# Patient Record
Sex: FEMALE | Race: WHITE | NOT HISPANIC OR LATINO | Employment: OTHER | URBAN - METROPOLITAN AREA
[De-identification: names, ages, dates, MRNs, and addresses within clinical notes are randomized per-mention and may not be internally consistent; named-entity substitution may affect disease eponyms.]

---

## 2017-01-01 ENCOUNTER — APPOINTMENT (OUTPATIENT)
Dept: GENERAL RADIOLOGY | Facility: HOSPITAL | Age: 82
End: 2017-01-01

## 2017-01-01 ENCOUNTER — HOSPITAL ENCOUNTER (EMERGENCY)
Facility: HOSPITAL | Age: 82
Discharge: HOME | End: 2017-09-28
Attending: EMERGENCY MEDICINE | Admitting: EMERGENCY MEDICINE

## 2017-01-01 VITALS
BODY MASS INDEX: 27.31 KG/M2 | DIASTOLIC BLOOD PRESSURE: 98 MMHG | OXYGEN SATURATION: 98 % | HEIGHT: 64 IN | TEMPERATURE: 97.6 F | WEIGHT: 160 LBS | SYSTOLIC BLOOD PRESSURE: 189 MMHG | HEART RATE: 61 BPM | RESPIRATION RATE: 18 BRPM

## 2017-01-01 DIAGNOSIS — F03.90 DEMENTIA WITHOUT BEHAVIORAL DISTURBANCE, UNSPECIFIED DEMENTIA TYPE: ICD-10-CM

## 2017-01-01 DIAGNOSIS — N39.0 URINARY TRACT INFECTION IN FEMALE: Primary | ICD-10-CM

## 2017-01-01 LAB
ALBUMIN SERPL-MCNC: 3.7 G/DL (ref 3.5–5.2)
ALBUMIN/GLOB SERPL: 1.3 G/DL
ALP SERPL-CCNC: 49 U/L (ref 39–117)
ALT SERPL W P-5'-P-CCNC: 11 U/L (ref 1–33)
ANION GAP SERPL CALCULATED.3IONS-SCNC: 11.2 MMOL/L
AST SERPL-CCNC: 17 U/L (ref 1–32)
BACTERIA SPEC AEROBE CULT: ABNORMAL
BACTERIA SPEC AEROBE CULT: ABNORMAL
BACTERIA UR QL AUTO: ABNORMAL /HPF
BASOPHILS # BLD AUTO: 0.03 10*3/MM3 (ref 0–0.2)
BASOPHILS NFR BLD AUTO: 0.4 % (ref 0–1.5)
BILIRUB SERPL-MCNC: 0.4 MG/DL (ref 0.1–1.2)
BILIRUB UR QL STRIP: NEGATIVE
BUN BLD-MCNC: 22 MG/DL (ref 8–23)
BUN/CREAT SERPL: 25.3 (ref 7–25)
CALCIUM SPEC-SCNC: 9.2 MG/DL (ref 8.6–10.5)
CHLORIDE SERPL-SCNC: 107 MMOL/L (ref 98–107)
CLARITY UR: ABNORMAL
CO2 SERPL-SCNC: 26.8 MMOL/L (ref 22–29)
COLOR UR: YELLOW
CREAT BLD-MCNC: 0.87 MG/DL (ref 0.57–1)
DEPRECATED RDW RBC AUTO: 48.2 FL (ref 37–54)
EOSINOPHIL # BLD AUTO: 0.38 10*3/MM3 (ref 0–0.7)
EOSINOPHIL NFR BLD AUTO: 4.8 % (ref 0.3–6.2)
ERYTHROCYTE [DISTWIDTH] IN BLOOD BY AUTOMATED COUNT: 13.8 % (ref 11.7–13)
GFR SERPL CREATININE-BSD FRML MDRD: 61 ML/MIN/1.73
GLOBULIN UR ELPH-MCNC: 2.8 GM/DL
GLUCOSE BLD-MCNC: 101 MG/DL (ref 65–99)
GLUCOSE UR STRIP-MCNC: NEGATIVE MG/DL
HCT VFR BLD AUTO: 43.4 % (ref 35.6–45.5)
HGB BLD-MCNC: 13.7 G/DL (ref 11.9–15.5)
HGB UR QL STRIP.AUTO: ABNORMAL
HOLD SPECIMEN: NORMAL
HOLD SPECIMEN: NORMAL
HYALINE CASTS UR QL AUTO: ABNORMAL /LPF
IMM GRANULOCYTES # BLD: 0.03 10*3/MM3 (ref 0–0.03)
IMM GRANULOCYTES NFR BLD: 0.4 % (ref 0–0.5)
KETONES UR QL STRIP: NEGATIVE
LEUKOCYTE ESTERASE UR QL STRIP.AUTO: ABNORMAL
LYMPHOCYTES # BLD AUTO: 2.73 10*3/MM3 (ref 0.9–4.8)
LYMPHOCYTES NFR BLD AUTO: 34.3 % (ref 19.6–45.3)
MAGNESIUM SERPL-MCNC: 2.1 MG/DL (ref 1.6–2.4)
MCH RBC QN AUTO: 30.2 PG (ref 26.9–32)
MCHC RBC AUTO-ENTMCNC: 31.6 G/DL (ref 32.4–36.3)
MCV RBC AUTO: 95.8 FL (ref 80.5–98.2)
MONOCYTES # BLD AUTO: 0.7 10*3/MM3 (ref 0.2–1.2)
MONOCYTES NFR BLD AUTO: 8.8 % (ref 5–12)
NEUTROPHILS # BLD AUTO: 4.08 10*3/MM3 (ref 1.9–8.1)
NEUTROPHILS NFR BLD AUTO: 51.3 % (ref 42.7–76)
NITRITE UR QL STRIP: POSITIVE
PH UR STRIP.AUTO: 6 [PH] (ref 5–8)
PLATELET # BLD AUTO: 150 10*3/MM3 (ref 140–500)
PMV BLD AUTO: 10.5 FL (ref 6–12)
POTASSIUM BLD-SCNC: 3.9 MMOL/L (ref 3.5–5.2)
PROT SERPL-MCNC: 6.5 G/DL (ref 6–8.5)
PROT UR QL STRIP: ABNORMAL
RBC # BLD AUTO: 4.53 10*6/MM3 (ref 3.9–5.2)
RBC # UR: ABNORMAL /HPF
REF LAB TEST METHOD: ABNORMAL
SODIUM BLD-SCNC: 145 MMOL/L (ref 136–145)
SP GR UR STRIP: 1.02 (ref 1–1.03)
SQUAMOUS #/AREA URNS HPF: ABNORMAL /HPF
TROPONIN T SERPL-MCNC: <0.01 NG/ML (ref 0–0.03)
UROBILINOGEN UR QL STRIP: ABNORMAL
WBC NRBC COR # BLD: 7.95 10*3/MM3 (ref 4.5–10.7)
WBC UR QL AUTO: ABNORMAL /HPF
WHOLE BLOOD HOLD SPECIMEN: NORMAL
WHOLE BLOOD HOLD SPECIMEN: NORMAL

## 2017-01-01 PROCEDURE — 87186 SC STD MICRODIL/AGAR DIL: CPT | Performed by: EMERGENCY MEDICINE

## 2017-01-01 PROCEDURE — 85025 COMPLETE CBC W/AUTO DIFF WBC: CPT | Performed by: EMERGENCY MEDICINE

## 2017-01-01 PROCEDURE — 81001 URINALYSIS AUTO W/SCOPE: CPT | Performed by: EMERGENCY MEDICINE

## 2017-01-01 PROCEDURE — 96365 THER/PROPH/DIAG IV INF INIT: CPT

## 2017-01-01 PROCEDURE — 87086 URINE CULTURE/COLONY COUNT: CPT | Performed by: EMERGENCY MEDICINE

## 2017-01-01 PROCEDURE — 99285 EMERGENCY DEPT VISIT HI MDM: CPT

## 2017-01-01 PROCEDURE — 80053 COMPREHEN METABOLIC PANEL: CPT | Performed by: EMERGENCY MEDICINE

## 2017-01-01 PROCEDURE — 25010000002 CEFTRIAXONE PER 250 MG: Performed by: EMERGENCY MEDICINE

## 2017-01-01 PROCEDURE — 84484 ASSAY OF TROPONIN QUANT: CPT | Performed by: EMERGENCY MEDICINE

## 2017-01-01 PROCEDURE — 83735 ASSAY OF MAGNESIUM: CPT | Performed by: EMERGENCY MEDICINE

## 2017-01-01 PROCEDURE — 71010 HC CHEST PA OR AP: CPT

## 2017-01-01 RX ORDER — CEPHALEXIN 500 MG/1
500 CAPSULE ORAL 3 TIMES DAILY
Qty: 21 CAPSULE | Refills: 0 | Status: SHIPPED | OUTPATIENT
Start: 2017-01-01 | End: 2018-01-01 | Stop reason: HOSPADM

## 2017-01-01 RX ORDER — CEFTRIAXONE SODIUM 1 G/50ML
1 INJECTION, SOLUTION INTRAVENOUS ONCE
Status: COMPLETED | OUTPATIENT
Start: 2017-01-01 | End: 2017-01-01

## 2017-01-01 RX ORDER — SODIUM CHLORIDE 0.9 % (FLUSH) 0.9 %
10 SYRINGE (ML) INJECTION AS NEEDED
Status: DISCONTINUED | OUTPATIENT
Start: 2017-01-01 | End: 2017-01-01 | Stop reason: HOSPADM

## 2017-01-01 RX ADMIN — CEFTRIAXONE SODIUM 1 G: 1 INJECTION, SOLUTION INTRAVENOUS at 01:56

## 2018-01-01 ENCOUNTER — APPOINTMENT (OUTPATIENT)
Dept: CARDIOLOGY | Facility: HOSPITAL | Age: 83
End: 2018-01-01
Attending: HOSPITALIST

## 2018-01-01 ENCOUNTER — APPOINTMENT (OUTPATIENT)
Dept: CT IMAGING | Facility: HOSPITAL | Age: 83
End: 2018-01-01

## 2018-01-01 ENCOUNTER — HOSPITAL ENCOUNTER (INPATIENT)
Facility: HOSPITAL | Age: 83
LOS: 3 days | End: 2018-05-06
Attending: INTERNAL MEDICINE | Admitting: INTERNAL MEDICINE

## 2018-01-01 ENCOUNTER — APPOINTMENT (OUTPATIENT)
Dept: GENERAL RADIOLOGY | Facility: HOSPITAL | Age: 83
End: 2018-01-01

## 2018-01-01 ENCOUNTER — APPOINTMENT (OUTPATIENT)
Dept: CARDIOLOGY | Facility: HOSPITAL | Age: 83
End: 2018-01-01
Attending: INTERNAL MEDICINE

## 2018-01-01 ENCOUNTER — LAB REQUISITION (OUTPATIENT)
Dept: LAB | Facility: HOSPITAL | Age: 83
End: 2018-01-01

## 2018-01-01 ENCOUNTER — HOSPITAL ENCOUNTER (INPATIENT)
Facility: HOSPITAL | Age: 83
LOS: 8 days | Discharge: HOSPICE | End: 2018-05-03
Attending: EMERGENCY MEDICINE | Admitting: INTERNAL MEDICINE

## 2018-01-01 ENCOUNTER — HOSPITAL ENCOUNTER (INPATIENT)
Facility: HOSPITAL | Age: 83
LOS: 6 days | Discharge: NURSING, REHAB OR LONG-TERM CARE | End: 2018-04-16
Attending: EMERGENCY MEDICINE | Admitting: INTERNAL MEDICINE

## 2018-01-01 ENCOUNTER — APPOINTMENT (OUTPATIENT)
Dept: CT IMAGING | Facility: HOSPITAL | Age: 83
End: 2018-01-01
Attending: PSYCHIATRY & NEUROLOGY

## 2018-01-01 VITALS
DIASTOLIC BLOOD PRESSURE: 89 MMHG | TEMPERATURE: 98.4 F | SYSTOLIC BLOOD PRESSURE: 144 MMHG | HEIGHT: 67 IN | BODY MASS INDEX: 23.54 KG/M2 | RESPIRATION RATE: 20 BRPM | OXYGEN SATURATION: 94 % | WEIGHT: 150 LBS | HEART RATE: 115 BPM

## 2018-01-01 VITALS — OXYGEN SATURATION: 58 % | SYSTOLIC BLOOD PRESSURE: 95 MMHG | DIASTOLIC BLOOD PRESSURE: 63 MMHG | TEMPERATURE: 98.5 F

## 2018-01-01 VITALS
OXYGEN SATURATION: 92 % | TEMPERATURE: 98 F | BODY MASS INDEX: 30.93 KG/M2 | HEART RATE: 106 BPM | SYSTOLIC BLOOD PRESSURE: 123 MMHG | WEIGHT: 192.44 LBS | DIASTOLIC BLOOD PRESSURE: 73 MMHG | HEIGHT: 66 IN | RESPIRATION RATE: 16 BRPM

## 2018-01-01 DIAGNOSIS — R77.8 ELEVATED TROPONIN: ICD-10-CM

## 2018-01-01 DIAGNOSIS — R13.12 OROPHARYNGEAL DYSPHAGIA: ICD-10-CM

## 2018-01-01 DIAGNOSIS — Z74.09 IMPAIRED FUNCTIONAL MOBILITY AND ACTIVITY TOLERANCE: ICD-10-CM

## 2018-01-01 DIAGNOSIS — Z00.00 ROUTINE GENERAL MEDICAL EXAMINATION AT A HEALTH CARE FACILITY: ICD-10-CM

## 2018-01-01 DIAGNOSIS — G92.9 TOXIC ENCEPHALOPATHY: ICD-10-CM

## 2018-01-01 DIAGNOSIS — N39.0 UTI (URINARY TRACT INFECTION) WITH PYURIA: Primary | ICD-10-CM

## 2018-01-01 DIAGNOSIS — I63.39 CEREBROVASCULAR ACCIDENT (CVA) DUE TO THROMBOSIS OF OTHER CEREBRAL ARTERY (HCC): Primary | ICD-10-CM

## 2018-01-01 DIAGNOSIS — R53.1 GENERAL WEAKNESS: ICD-10-CM

## 2018-01-01 LAB
ALBUMIN SERPL-MCNC: 3 G/DL (ref 3.5–5.2)
ALBUMIN SERPL-MCNC: 3.1 G/DL (ref 3.5–5.2)
ALBUMIN SERPL-MCNC: 3.3 G/DL (ref 3.5–5.2)
ALBUMIN SERPL-MCNC: 3.3 G/DL (ref 3.5–5.2)
ALBUMIN SERPL-MCNC: 3.9 G/DL (ref 3.5–5.2)
ALBUMIN/GLOB SERPL: 1 G/DL
ALBUMIN/GLOB SERPL: 1.1 G/DL
ALBUMIN/GLOB SERPL: 1.3 G/DL
ALP SERPL-CCNC: 50 U/L (ref 39–117)
ALP SERPL-CCNC: 50 U/L (ref 39–117)
ALP SERPL-CCNC: 65 U/L (ref 39–117)
ALT SERPL W P-5'-P-CCNC: 15 U/L (ref 1–33)
ALT SERPL W P-5'-P-CCNC: 7 U/L (ref 1–33)
ALT SERPL W P-5'-P-CCNC: 7 U/L (ref 1–33)
ANION GAP SERPL CALCULATED.3IONS-SCNC: 11 MMOL/L
ANION GAP SERPL CALCULATED.3IONS-SCNC: 11.5 MMOL/L
ANION GAP SERPL CALCULATED.3IONS-SCNC: 12.4 MMOL/L
ANION GAP SERPL CALCULATED.3IONS-SCNC: 13.9 MMOL/L
ANION GAP SERPL CALCULATED.3IONS-SCNC: 14 MMOL/L
ANION GAP SERPL CALCULATED.3IONS-SCNC: 14.9 MMOL/L
ANION GAP SERPL CALCULATED.3IONS-SCNC: 17.1 MMOL/L
ASCENDING AORTA: 2.8 CM
AST SERPL-CCNC: 15 U/L (ref 1–32)
AST SERPL-CCNC: 18 U/L (ref 1–32)
AST SERPL-CCNC: 18 U/L (ref 1–32)
BACTERIA SPEC AEROBE CULT: ABNORMAL
BACTERIA SPEC AEROBE CULT: NORMAL
BACTERIA SPEC AEROBE CULT: NORMAL
BACTERIA UR QL AUTO: ABNORMAL /HPF
BACTERIA UR QL AUTO: ABNORMAL /HPF
BASOPHILS # BLD AUTO: 0.02 10*3/MM3 (ref 0–0.2)
BASOPHILS # BLD AUTO: 0.03 10*3/MM3 (ref 0–0.2)
BASOPHILS # BLD AUTO: 0.03 10*3/MM3 (ref 0–0.2)
BASOPHILS # BLD AUTO: 0.04 10*3/MM3 (ref 0–0.2)
BASOPHILS NFR BLD AUTO: 0.2 % (ref 0–1.5)
BASOPHILS NFR BLD AUTO: 0.4 % (ref 0–1.5)
BASOPHILS NFR BLD AUTO: 0.4 % (ref 0–1.5)
BASOPHILS NFR BLD AUTO: 0.6 % (ref 0–1.5)
BH CV ECHO MEAS - ACS: 1.9 CM
BH CV ECHO MEAS - ACS: 1.9 CM
BH CV ECHO MEAS - AO MAX PG: 4 MMHG
BH CV ECHO MEAS - AO MEAN PG (FULL): 1 MMHG
BH CV ECHO MEAS - AO MEAN PG (FULL): 1 MMHG
BH CV ECHO MEAS - AO MEAN PG: 2 MMHG
BH CV ECHO MEAS - AO MEAN PG: 3 MMHG
BH CV ECHO MEAS - AO ROOT AREA (BSA CORRECTED): 1.8
BH CV ECHO MEAS - AO ROOT AREA (BSA CORRECTED): 1.9
BH CV ECHO MEAS - AO ROOT AREA: 8.6 CM^2
BH CV ECHO MEAS - AO ROOT AREA: 8.6 CM^2
BH CV ECHO MEAS - AO ROOT DIAM: 3.3 CM
BH CV ECHO MEAS - AO ROOT DIAM: 3.3 CM
BH CV ECHO MEAS - AO V2 MAX: 99 CM/SEC
BH CV ECHO MEAS - AO V2 MEAN: 65.8 CM/SEC
BH CV ECHO MEAS - AO V2 MEAN: 80.5 CM/SEC
BH CV ECHO MEAS - AO V2 VTI: 13.2 CM
BH CV ECHO MEAS - AO V2 VTI: 17.4 CM
BH CV ECHO MEAS - ASC AORTA: 2.8 CM
BH CV ECHO MEAS - AVA(I,A): 2.4 CM^2
BH CV ECHO MEAS - AVA(I,A): 2.6 CM^2
BH CV ECHO MEAS - AVA(I,D): 2.4 CM^2
BH CV ECHO MEAS - AVA(I,D): 2.6 CM^2
BH CV ECHO MEAS - BSA(HAYCOCK): 1.8 M^2
BH CV ECHO MEAS - BSA(HAYCOCK): 1.8 M^2
BH CV ECHO MEAS - BSA: 1.7 M^2
BH CV ECHO MEAS - BSA: 1.8 M^2
BH CV ECHO MEAS - BZI_BMI: 23.5 KILOGRAMS/M^2
BH CV ECHO MEAS - BZI_BMI: 34.7 KILOGRAMS/M^2
BH CV ECHO MEAS - BZI_METRIC_HEIGHT: 149.9 CM
BH CV ECHO MEAS - BZI_METRIC_HEIGHT: 170.2 CM
BH CV ECHO MEAS - BZI_METRIC_WEIGHT: 68 KG
BH CV ECHO MEAS - BZI_METRIC_WEIGHT: 78 KG
BH CV ECHO MEAS - CONTRAST EF (2CH): 42.3 ML/M^2
BH CV ECHO MEAS - CONTRAST EF (2CH): 60 ML/M^2
BH CV ECHO MEAS - CONTRAST EF 4CH: 43.5 ML/M^2
BH CV ECHO MEAS - CONTRAST EF 4CH: 60.2 ML/M^2
BH CV ECHO MEAS - EDV(CUBED): 53.6 ML
BH CV ECHO MEAS - EDV(MOD-SP2): 110 ML
BH CV ECHO MEAS - EDV(MOD-SP2): 71 ML
BH CV ECHO MEAS - EDV(MOD-SP4): 133 ML
BH CV ECHO MEAS - EDV(MOD-SP4): 69 ML
BH CV ECHO MEAS - EDV(TEICH): 107.5 ML
BH CV ECHO MEAS - EDV(TEICH): 60.8 ML
BH CV ECHO MEAS - EF(CUBED): 58.1 %
BH CV ECHO MEAS - EF(CUBED): 61.2 %
BH CV ECHO MEAS - EF(MOD-BP): 43 %
BH CV ECHO MEAS - EF(MOD-SP2): 42.3 %
BH CV ECHO MEAS - EF(MOD-SP2): 60 %
BH CV ECHO MEAS - EF(MOD-SP4): 43.5 %
BH CV ECHO MEAS - EF(MOD-SP4): 60.2 %
BH CV ECHO MEAS - EF(TEICH): 50.5 %
BH CV ECHO MEAS - EF(TEICH): 52.7 %
BH CV ECHO MEAS - ESV(CUBED): 22.4 ML
BH CV ECHO MEAS - ESV(MOD-SP2): 41 ML
BH CV ECHO MEAS - ESV(MOD-SP2): 44 ML
BH CV ECHO MEAS - ESV(MOD-SP4): 39 ML
BH CV ECHO MEAS - ESV(MOD-SP4): 53 ML
BH CV ECHO MEAS - ESV(TEICH): 30.1 ML
BH CV ECHO MEAS - ESV(TEICH): 50.9 ML
BH CV ECHO MEAS - FS: 25.2 %
BH CV ECHO MEAS - FS: 27.1 %
BH CV ECHO MEAS - IVS/LVPW: 1
BH CV ECHO MEAS - IVS/LVPW: 1.2
BH CV ECHO MEAS - IVSD: 1.2 CM
BH CV ECHO MEAS - IVSD: 1.7 CM
BH CV ECHO MEAS - LAT PEAK E' VEL: 10 CM/SEC
BH CV ECHO MEAS - LAT PEAK E' VEL: 8 CM/SEC
BH CV ECHO MEAS - LV DIASTOLIC VOL/BSA (35-75): 38.6 ML/M^2
BH CV ECHO MEAS - LV DIASTOLIC VOL/BSA (35-75): 76.9 ML/M^2
BH CV ECHO MEAS - LV MASS(C)D: 219.1 GRAMS
BH CV ECHO MEAS - LV MASS(C)D: 227.6 GRAMS
BH CV ECHO MEAS - LV MASS(C)DI: 122.5 GRAMS/M^2
BH CV ECHO MEAS - LV MASS(C)DI: 131.6 GRAMS/M^2
BH CV ECHO MEAS - LV MEAN PG: 1 MMHG
BH CV ECHO MEAS - LV MEAN PG: 2 MMHG
BH CV ECHO MEAS - LV SYSTOLIC VOL/BSA (12-30): 21.8 ML/M^2
BH CV ECHO MEAS - LV SYSTOLIC VOL/BSA (12-30): 30.6 ML/M^2
BH CV ECHO MEAS - LV V1 MAX: 78 CM/SEC
BH CV ECHO MEAS - LV V1 MEAN: 52.1 CM/SEC
BH CV ECHO MEAS - LV V1 MEAN: 62.1 CM/SEC
BH CV ECHO MEAS - LV V1 VTI: 13.2 CM
BH CV ECHO MEAS - LV V1 VTI: 13.5 CM
BH CV ECHO MEAS - LVIDD: 3.8 CM
BH CV ECHO MEAS - LVIDD: 4.8 CM
BH CV ECHO MEAS - LVIDS: 2.8 CM
BH CV ECHO MEAS - LVIDS: 3.5 CM
BH CV ECHO MEAS - LVLD AP2: 7.1 CM
BH CV ECHO MEAS - LVLD AP2: 7.5 CM
BH CV ECHO MEAS - LVLD AP4: 7.1 CM
BH CV ECHO MEAS - LVLD AP4: 8.6 CM
BH CV ECHO MEAS - LVLS AP2: 6.5 CM
BH CV ECHO MEAS - LVLS AP2: 6.5 CM
BH CV ECHO MEAS - LVLS AP4: 6.3 CM
BH CV ECHO MEAS - LVLS AP4: 6.9 CM
BH CV ECHO MEAS - LVOT AREA (M): 2.5 CM^2
BH CV ECHO MEAS - LVOT AREA (M): 3.1 CM^2
BH CV ECHO MEAS - LVOT AREA: 2.5 CM^2
BH CV ECHO MEAS - LVOT AREA: 3.1 CM^2
BH CV ECHO MEAS - LVOT DIAM: 1.8 CM
BH CV ECHO MEAS - LVOT DIAM: 2 CM
BH CV ECHO MEAS - LVPWD: 1.2 CM
BH CV ECHO MEAS - LVPWD: 1.4 CM
BH CV ECHO MEAS - MED PEAK E' VEL: 5 CM/SEC
BH CV ECHO MEAS - MED PEAK E' VEL: 7 CM/SEC
BH CV ECHO MEAS - MR MAX PG: 21.5 MMHG
BH CV ECHO MEAS - MR MAX PG: 51.8 MMHG
BH CV ECHO MEAS - MR MAX VEL: 232 CM/SEC
BH CV ECHO MEAS - MR MAX VEL: 360 CM/SEC
BH CV ECHO MEAS - MV DEC SLOPE: 635 CM/SEC^2
BH CV ECHO MEAS - MV DEC SLOPE: 980 CM/SEC^2
BH CV ECHO MEAS - MV DEC TIME: 0.11 SEC
BH CV ECHO MEAS - MV DEC TIME: 0.2 SEC
BH CV ECHO MEAS - MV E MAX VEL: 115 CM/SEC
BH CV ECHO MEAS - MV E MAX VEL: 132 CM/SEC
BH CV ECHO MEAS - MV MEAN PG: 2 MMHG
BH CV ECHO MEAS - MV MEAN PG: 3 MMHG
BH CV ECHO MEAS - MV P1/2T MAX VEL: 113 CM/SEC
BH CV ECHO MEAS - MV P1/2T MAX VEL: 122 CM/SEC
BH CV ECHO MEAS - MV P1/2T: 36.5 MSEC
BH CV ECHO MEAS - MV P1/2T: 52.1 MSEC
BH CV ECHO MEAS - MV V2 MEAN: 64.5 CM/SEC
BH CV ECHO MEAS - MV V2 MEAN: 78.8 CM/SEC
BH CV ECHO MEAS - MV V2 VTI: 15.8 CM
BH CV ECHO MEAS - MV V2 VTI: 23.7 CM
BH CV ECHO MEAS - MVA P1/2T LCG: 1.8 CM^2
BH CV ECHO MEAS - MVA P1/2T LCG: 1.9 CM^2
BH CV ECHO MEAS - MVA(P1/2T): 4.2 CM^2
BH CV ECHO MEAS - MVA(P1/2T): 6 CM^2
BH CV ECHO MEAS - MVA(VTI): 1.4 CM^2
BH CV ECHO MEAS - MVA(VTI): 2.6 CM^2
BH CV ECHO MEAS - PA ACC SLOPE: 16.8 CM/SEC^2
BH CV ECHO MEAS - PA ACC TIME: 0.08 SEC
BH CV ECHO MEAS - PA MAX PG: 1.9 MMHG
BH CV ECHO MEAS - PA MAX PG: 3.4 MMHG
BH CV ECHO MEAS - PA PR(ACCEL): 42.6 MMHG
BH CV ECHO MEAS - PA V2 MAX: 69.5 CM/SEC
BH CV ECHO MEAS - PA V2 MAX: 92.3 CM/SEC
BH CV ECHO MEAS - QP/QS: 1.2
BH CV ECHO MEAS - RAP SYSTOLE: 15 MMHG
BH CV ECHO MEAS - RAP SYSTOLE: 8 MMHG
BH CV ECHO MEAS - RV MEAN PG: 1 MMHG
BH CV ECHO MEAS - RV V1 MEAN: 38.7 CM/SEC
BH CV ECHO MEAS - RV V1 VTI: 12.9 CM
BH CV ECHO MEAS - RVOT AREA: 3.8 CM^2
BH CV ECHO MEAS - RVOT DIAM: 2.2 CM
BH CV ECHO MEAS - RVSP: 25 MMHG
BH CV ECHO MEAS - RVSP: 39.6 MMHG
BH CV ECHO MEAS - SI(AO): 63.1 ML/M^2
BH CV ECHO MEAS - SI(AO): 86 ML/M^2
BH CV ECHO MEAS - SI(CUBED): 18 ML/M^2
BH CV ECHO MEAS - SI(CUBED): 37.8 ML/M^2
BH CV ECHO MEAS - SI(LVOT): 19.2 ML/M^2
BH CV ECHO MEAS - SI(LVOT): 24 ML/M^2
BH CV ECHO MEAS - SI(MOD-SP2): 16.8 ML/M^2
BH CV ECHO MEAS - SI(MOD-SP2): 38.2 ML/M^2
BH CV ECHO MEAS - SI(MOD-SP4): 16.8 ML/M^2
BH CV ECHO MEAS - SI(MOD-SP4): 46.3 ML/M^2
BH CV ECHO MEAS - SI(TEICH): 17.8 ML/M^2
BH CV ECHO MEAS - SI(TEICH): 31.7 ML/M^2
BH CV ECHO MEAS - SUP REN AO DIAM: 1.9 CM
BH CV ECHO MEAS - SUP REN AO DIAM: 2.6 CM
BH CV ECHO MEAS - SV(AO): 112.9 ML
BH CV ECHO MEAS - SV(AO): 148.8 ML
BH CV ECHO MEAS - SV(CUBED): 31.2 ML
BH CV ECHO MEAS - SV(CUBED): 67.7 ML
BH CV ECHO MEAS - SV(LVOT): 34.4 ML
BH CV ECHO MEAS - SV(LVOT): 41.5 ML
BH CV ECHO MEAS - SV(MOD-SP2): 30 ML
BH CV ECHO MEAS - SV(MOD-SP2): 66 ML
BH CV ECHO MEAS - SV(MOD-SP4): 30 ML
BH CV ECHO MEAS - SV(MOD-SP4): 80 ML
BH CV ECHO MEAS - SV(RVOT): 49 ML
BH CV ECHO MEAS - SV(TEICH): 30.7 ML
BH CV ECHO MEAS - SV(TEICH): 56.7 ML
BH CV ECHO MEAS - TAPSE (>1.6): 1.8 CM2
BH CV ECHO MEAS - TAPSE (>1.6): 2.3 CM2
BH CV ECHO MEAS - TR MAX VEL: 208 CM/SEC
BH CV ECHO MEAS - TR MAX VEL: 248 CM/SEC
BH CV ECHO MEASUREMENTS AVERAGE E/E' RATIO: 15.33
BH CV VAS BP RIGHT ARM: NORMAL MMHG
BH CV XLRA - RV BASE: 3 CM
BH CV XLRA - RV BASE: 3.1 CM
BH CV XLRA - TDI S': 5 CM/SEC
BILIRUB SERPL-MCNC: 0.9 MG/DL (ref 0.1–1.2)
BILIRUB SERPL-MCNC: 1.6 MG/DL (ref 0.1–1.2)
BILIRUB SERPL-MCNC: 1.7 MG/DL (ref 0.1–1.2)
BILIRUB UR QL STRIP: NEGATIVE
BILIRUB UR QL STRIP: NEGATIVE
BUN BLD-MCNC: 12 MG/DL (ref 8–23)
BUN BLD-MCNC: 13 MG/DL (ref 8–23)
BUN BLD-MCNC: 13 MG/DL (ref 8–23)
BUN BLD-MCNC: 15 MG/DL (ref 8–23)
BUN BLD-MCNC: 17 MG/DL (ref 8–23)
BUN BLD-MCNC: 18 MG/DL (ref 8–23)
BUN BLD-MCNC: 9 MG/DL (ref 8–23)
BUN/CREAT SERPL: 12.2 (ref 7–25)
BUN/CREAT SERPL: 17.3 (ref 7–25)
BUN/CREAT SERPL: 18.2 (ref 7–25)
BUN/CREAT SERPL: 18.8 (ref 7–25)
BUN/CREAT SERPL: 22 (ref 7–25)
BUN/CREAT SERPL: 23.1 (ref 7–25)
BUN/CREAT SERPL: 23.6 (ref 7–25)
CALCIUM SPEC-SCNC: 8.3 MG/DL (ref 8.6–10.5)
CALCIUM SPEC-SCNC: 8.5 MG/DL (ref 8.6–10.5)
CALCIUM SPEC-SCNC: 8.6 MG/DL (ref 8.6–10.5)
CALCIUM SPEC-SCNC: 8.6 MG/DL (ref 8.6–10.5)
CALCIUM SPEC-SCNC: 8.8 MG/DL (ref 8.6–10.5)
CALCIUM SPEC-SCNC: 8.9 MG/DL (ref 8.6–10.5)
CALCIUM SPEC-SCNC: 8.9 MG/DL (ref 8.6–10.5)
CHLORIDE SERPL-SCNC: 101 MMOL/L (ref 98–107)
CHLORIDE SERPL-SCNC: 104 MMOL/L (ref 98–107)
CHLORIDE SERPL-SCNC: 105 MMOL/L (ref 98–107)
CHLORIDE SERPL-SCNC: 107 MMOL/L (ref 98–107)
CHLORIDE SERPL-SCNC: 108 MMOL/L (ref 98–107)
CHLORIDE SERPL-SCNC: 108 MMOL/L (ref 98–107)
CHLORIDE SERPL-SCNC: 112 MMOL/L (ref 98–107)
CHOLEST SERPL-MCNC: 125 MG/DL (ref 0–200)
CLARITY UR: ABNORMAL
CLARITY UR: ABNORMAL
CO2 SERPL-SCNC: 21.1 MMOL/L (ref 22–29)
CO2 SERPL-SCNC: 21.1 MMOL/L (ref 22–29)
CO2 SERPL-SCNC: 22 MMOL/L (ref 22–29)
CO2 SERPL-SCNC: 22 MMOL/L (ref 22–29)
CO2 SERPL-SCNC: 23.9 MMOL/L (ref 22–29)
CO2 SERPL-SCNC: 24.5 MMOL/L (ref 22–29)
CO2 SERPL-SCNC: 25.6 MMOL/L (ref 22–29)
COLOR UR: YELLOW
COLOR UR: YELLOW
CREAT BLD-MCNC: 0.65 MG/DL (ref 0.57–1)
CREAT BLD-MCNC: 0.66 MG/DL (ref 0.57–1)
CREAT BLD-MCNC: 0.69 MG/DL (ref 0.57–1)
CREAT BLD-MCNC: 0.72 MG/DL (ref 0.57–1)
CREAT BLD-MCNC: 0.74 MG/DL (ref 0.57–1)
CREAT BLD-MCNC: 0.75 MG/DL (ref 0.57–1)
CREAT BLD-MCNC: 0.82 MG/DL (ref 0.57–1)
D-LACTATE SERPL-SCNC: 1.4 MMOL/L (ref 0.5–2)
DEPRECATED RDW RBC AUTO: 47.3 FL (ref 37–54)
DEPRECATED RDW RBC AUTO: 47.7 FL (ref 37–54)
DEPRECATED RDW RBC AUTO: 48.5 FL (ref 37–54)
DEPRECATED RDW RBC AUTO: 50.4 FL (ref 37–54)
DEPRECATED RDW RBC AUTO: 51.4 FL (ref 37–54)
E/E' RATIO: 27
EOSINOPHIL # BLD AUTO: 0.14 10*3/MM3 (ref 0–0.7)
EOSINOPHIL # BLD AUTO: 0.21 10*3/MM3 (ref 0–0.7)
EOSINOPHIL # BLD AUTO: 0.23 10*3/MM3 (ref 0–0.7)
EOSINOPHIL # BLD AUTO: 0.24 10*3/MM3 (ref 0–0.7)
EOSINOPHIL NFR BLD AUTO: 2.1 % (ref 0.3–6.2)
EOSINOPHIL NFR BLD AUTO: 2.2 % (ref 0.3–6.2)
EOSINOPHIL NFR BLD AUTO: 3.3 % (ref 0.3–6.2)
EOSINOPHIL NFR BLD AUTO: 3.4 % (ref 0.3–6.2)
ERYTHROCYTE [DISTWIDTH] IN BLOOD BY AUTOMATED COUNT: 14 % (ref 11.7–13)
ERYTHROCYTE [DISTWIDTH] IN BLOOD BY AUTOMATED COUNT: 14 % (ref 11.7–13)
ERYTHROCYTE [DISTWIDTH] IN BLOOD BY AUTOMATED COUNT: 14.3 % (ref 11.7–13)
ERYTHROCYTE [DISTWIDTH] IN BLOOD BY AUTOMATED COUNT: 14.3 % (ref 11.7–13)
ERYTHROCYTE [DISTWIDTH] IN BLOOD BY AUTOMATED COUNT: 14.5 % (ref 11.7–13)
GFR SERPL CREATININE-BSD FRML MDRD: 66 ML/MIN/1.73
GFR SERPL CREATININE-BSD FRML MDRD: 73 ML/MIN/1.73
GFR SERPL CREATININE-BSD FRML MDRD: 74 ML/MIN/1.73
GFR SERPL CREATININE-BSD FRML MDRD: 76 ML/MIN/1.73
GFR SERPL CREATININE-BSD FRML MDRD: 80 ML/MIN/1.73
GFR SERPL CREATININE-BSD FRML MDRD: 84 ML/MIN/1.73
GFR SERPL CREATININE-BSD FRML MDRD: 86 ML/MIN/1.73
GLOBULIN UR ELPH-MCNC: 3 GM/DL
GLOBULIN UR ELPH-MCNC: 3.1 GM/DL
GLOBULIN UR ELPH-MCNC: 3.1 GM/DL
GLUCOSE BLD-MCNC: 104 MG/DL (ref 65–99)
GLUCOSE BLD-MCNC: 111 MG/DL (ref 65–99)
GLUCOSE BLD-MCNC: 127 MG/DL (ref 65–99)
GLUCOSE BLD-MCNC: 146 MG/DL (ref 65–99)
GLUCOSE BLD-MCNC: 81 MG/DL (ref 65–99)
GLUCOSE BLD-MCNC: 93 MG/DL (ref 65–99)
GLUCOSE BLD-MCNC: 97 MG/DL (ref 65–99)
GLUCOSE BLDC GLUCOMTR-MCNC: 103 MG/DL (ref 70–130)
GLUCOSE BLDC GLUCOMTR-MCNC: 125 MG/DL (ref 70–130)
GLUCOSE BLDC GLUCOMTR-MCNC: 139 MG/DL (ref 70–130)
GLUCOSE BLDC GLUCOMTR-MCNC: 163 MG/DL (ref 70–130)
GLUCOSE BLDC GLUCOMTR-MCNC: 96 MG/DL (ref 70–130)
GLUCOSE UR STRIP-MCNC: NEGATIVE MG/DL
GLUCOSE UR STRIP-MCNC: NEGATIVE MG/DL
HBA1C MFR BLD: 5 % (ref 4.8–5.6)
HCT VFR BLD AUTO: 37.7 % (ref 35.6–45.5)
HCT VFR BLD AUTO: 38.3 % (ref 35.6–45.5)
HCT VFR BLD AUTO: 39.1 % (ref 35.6–45.5)
HCT VFR BLD AUTO: 39.8 % (ref 35.6–45.5)
HCT VFR BLD AUTO: 43.9 % (ref 35.6–45.5)
HDLC SERPL-MCNC: 32 MG/DL (ref 40–60)
HGB BLD-MCNC: 12 G/DL (ref 11.9–15.5)
HGB BLD-MCNC: 12 G/DL (ref 11.9–15.5)
HGB BLD-MCNC: 12.2 G/DL (ref 11.9–15.5)
HGB BLD-MCNC: 12.2 G/DL (ref 11.9–15.5)
HGB BLD-MCNC: 13.7 G/DL (ref 11.9–15.5)
HGB UR QL STRIP.AUTO: ABNORMAL
HGB UR QL STRIP.AUTO: ABNORMAL
HOLD SPECIMEN: NORMAL
HYALINE CASTS UR QL AUTO: ABNORMAL /LPF
HYALINE CASTS UR QL AUTO: ABNORMAL /LPF
IMM GRANULOCYTES # BLD: 0.02 10*3/MM3 (ref 0–0.03)
IMM GRANULOCYTES # BLD: 0.03 10*3/MM3 (ref 0–0.03)
IMM GRANULOCYTES NFR BLD: 0.3 % (ref 0–0.5)
INR PPP: 1.12 (ref 0.9–1.1)
KETONES UR QL STRIP: NEGATIVE
KETONES UR QL STRIP: NEGATIVE
LDLC SERPL CALC-MCNC: 79 MG/DL (ref 0–100)
LDLC/HDLC SERPL: 2.46 {RATIO}
LEFT ATRIUM VOLUME INDEX: 38 ML/M2
LEFT ATRIUM VOLUME INDEX: 74 ML/M2
LEUKOCYTE ESTERASE UR QL STRIP.AUTO: ABNORMAL
LEUKOCYTE ESTERASE UR QL STRIP.AUTO: ABNORMAL
LV EF 2D ECHO EST: 60 %
LYMPHOCYTES # BLD AUTO: 1.55 10*3/MM3 (ref 0.9–4.8)
LYMPHOCYTES # BLD AUTO: 1.75 10*3/MM3 (ref 0.9–4.8)
LYMPHOCYTES # BLD AUTO: 1.99 10*3/MM3 (ref 0.9–4.8)
LYMPHOCYTES # BLD AUTO: 2.06 10*3/MM3 (ref 0.9–4.8)
LYMPHOCYTES NFR BLD AUTO: 20.4 % (ref 19.6–45.3)
LYMPHOCYTES NFR BLD AUTO: 22 % (ref 19.6–45.3)
LYMPHOCYTES NFR BLD AUTO: 26.2 % (ref 19.6–45.3)
LYMPHOCYTES NFR BLD AUTO: 28.8 % (ref 19.6–45.3)
MAGNESIUM SERPL-MCNC: 1.9 MG/DL (ref 1.6–2.4)
MAGNESIUM SERPL-MCNC: 1.9 MG/DL (ref 1.6–2.4)
MAGNESIUM SERPL-MCNC: 2.2 MG/DL (ref 1.6–2.4)
MAXIMAL PREDICTED HEART RATE: 131 BPM
MCH RBC QN AUTO: 29.1 PG (ref 26.9–32)
MCH RBC QN AUTO: 29.3 PG (ref 26.9–32)
MCH RBC QN AUTO: 29.8 PG (ref 26.9–32)
MCH RBC QN AUTO: 29.9 PG (ref 26.9–32)
MCH RBC QN AUTO: 29.9 PG (ref 26.9–32)
MCHC RBC AUTO-ENTMCNC: 30.7 G/DL (ref 32.4–36.3)
MCHC RBC AUTO-ENTMCNC: 31.2 G/DL (ref 32.4–36.3)
MCHC RBC AUTO-ENTMCNC: 31.2 G/DL (ref 32.4–36.3)
MCHC RBC AUTO-ENTMCNC: 31.3 G/DL (ref 32.4–36.3)
MCHC RBC AUTO-ENTMCNC: 31.8 G/DL (ref 32.4–36.3)
MCV RBC AUTO: 93 FL (ref 80.5–98.2)
MCV RBC AUTO: 93.5 FL (ref 80.5–98.2)
MCV RBC AUTO: 94 FL (ref 80.5–98.2)
MCV RBC AUTO: 95.9 FL (ref 80.5–98.2)
MCV RBC AUTO: 97.5 FL (ref 80.5–98.2)
MONOCYTES # BLD AUTO: 0.61 10*3/MM3 (ref 0.2–1.2)
MONOCYTES # BLD AUTO: 0.61 10*3/MM3 (ref 0.2–1.2)
MONOCYTES # BLD AUTO: 0.72 10*3/MM3 (ref 0.2–1.2)
MONOCYTES # BLD AUTO: 0.98 10*3/MM3 (ref 0.2–1.2)
MONOCYTES NFR BLD AUTO: 10 % (ref 5–12)
MONOCYTES NFR BLD AUTO: 10.2 % (ref 5–12)
MONOCYTES NFR BLD AUTO: 8.5 % (ref 5–12)
MONOCYTES NFR BLD AUTO: 9.1 % (ref 5–12)
NEUTROPHILS # BLD AUTO: 4.13 10*3/MM3 (ref 1.9–8.1)
NEUTROPHILS # BLD AUTO: 4.18 10*3/MM3 (ref 1.9–8.1)
NEUTROPHILS # BLD AUTO: 4.51 10*3/MM3 (ref 1.9–8.1)
NEUTROPHILS # BLD AUTO: 6.53 10*3/MM3 (ref 1.9–8.1)
NEUTROPHILS NFR BLD AUTO: 58.4 % (ref 42.7–76)
NEUTROPHILS NFR BLD AUTO: 61.9 % (ref 42.7–76)
NEUTROPHILS NFR BLD AUTO: 63.8 % (ref 42.7–76)
NEUTROPHILS NFR BLD AUTO: 66.9 % (ref 42.7–76)
NITRITE UR QL STRIP: NEGATIVE
NITRITE UR QL STRIP: POSITIVE
PH UR STRIP.AUTO: 5.5 [PH] (ref 5–8)
PH UR STRIP.AUTO: 6.5 [PH] (ref 5–8)
PHOSPHATE SERPL-MCNC: 2.8 MG/DL (ref 2.5–4.5)
PHOSPHATE SERPL-MCNC: 3.1 MG/DL (ref 2.5–4.5)
PLATELET # BLD AUTO: 153 10*3/MM3 (ref 140–500)
PLATELET # BLD AUTO: 164 10*3/MM3 (ref 140–500)
PLATELET # BLD AUTO: 173 10*3/MM3 (ref 140–500)
PLATELET # BLD AUTO: 175 10*3/MM3 (ref 140–500)
PLATELET # BLD AUTO: 191 10*3/MM3 (ref 140–500)
PMV BLD AUTO: 10.4 FL (ref 6–12)
PMV BLD AUTO: 10.6 FL (ref 6–12)
PMV BLD AUTO: 10.6 FL (ref 6–12)
PMV BLD AUTO: 10.9 FL (ref 6–12)
PMV BLD AUTO: 11.7 FL (ref 6–12)
POTASSIUM BLD-SCNC: 3 MMOL/L (ref 3.5–5.2)
POTASSIUM BLD-SCNC: 3.1 MMOL/L (ref 3.5–5.2)
POTASSIUM BLD-SCNC: 3.2 MMOL/L (ref 3.5–5.2)
POTASSIUM BLD-SCNC: 3.3 MMOL/L (ref 3.5–5.2)
POTASSIUM BLD-SCNC: 3.5 MMOL/L (ref 3.5–5.2)
POTASSIUM BLD-SCNC: 3.6 MMOL/L (ref 3.5–5.2)
POTASSIUM BLD-SCNC: 4.1 MMOL/L (ref 3.5–5.2)
PROCALCITONIN SERPL-MCNC: 0.04 NG/ML (ref 0.1–0.25)
PROT SERPL-MCNC: 6 G/DL (ref 6–8.5)
PROT SERPL-MCNC: 6.4 G/DL (ref 6–8.5)
PROT SERPL-MCNC: 7 G/DL (ref 6–8.5)
PROT UR QL STRIP: ABNORMAL
PROT UR QL STRIP: ABNORMAL
PROTHROMBIN TIME: 14.2 SECONDS (ref 11.7–14.2)
RBC # BLD AUTO: 4.03 10*6/MM3 (ref 3.9–5.2)
RBC # BLD AUTO: 4.08 10*6/MM3 (ref 3.9–5.2)
RBC # BLD AUTO: 4.12 10*6/MM3 (ref 3.9–5.2)
RBC # BLD AUTO: 4.16 10*6/MM3 (ref 3.9–5.2)
RBC # BLD AUTO: 4.58 10*6/MM3 (ref 3.9–5.2)
RBC # UR: ABNORMAL /HPF
RBC # UR: ABNORMAL /HPF
REF LAB TEST METHOD: ABNORMAL
REF LAB TEST METHOD: ABNORMAL
SINUS: 2.4 CM
SODIUM BLD-SCNC: 138 MMOL/L (ref 136–145)
SODIUM BLD-SCNC: 140 MMOL/L (ref 136–145)
SODIUM BLD-SCNC: 142 MMOL/L (ref 136–145)
SODIUM BLD-SCNC: 143 MMOL/L (ref 136–145)
SODIUM BLD-SCNC: 144 MMOL/L (ref 136–145)
SODIUM BLD-SCNC: 145 MMOL/L (ref 136–145)
SODIUM BLD-SCNC: 148 MMOL/L (ref 136–145)
SP GR UR STRIP: 1.01 (ref 1–1.03)
SP GR UR STRIP: 1.02 (ref 1–1.03)
SQUAMOUS #/AREA URNS HPF: ABNORMAL /HPF
SQUAMOUS #/AREA URNS HPF: ABNORMAL /HPF
STJ: 3.1 CM
STRESS TARGET HR: 111 BPM
TRIGL SERPL-MCNC: 72 MG/DL (ref 0–150)
TROPONIN T SERPL-MCNC: 0.06 NG/ML (ref 0–0.03)
TROPONIN T SERPL-MCNC: 0.09 NG/ML (ref 0–0.03)
TROPONIN T SERPL-MCNC: <0.01 NG/ML (ref 0–0.03)
TSH SERPL DL<=0.05 MIU/L-ACNC: 1 MIU/ML (ref 0.27–4.2)
TSH SERPL DL<=0.05 MIU/L-ACNC: 3.15 MIU/ML (ref 0.27–4.2)
TSH SERPL DL<=0.05 MIU/L-ACNC: 3.18 MIU/ML (ref 0.27–4.2)
UROBILINOGEN UR QL STRIP: ABNORMAL
UROBILINOGEN UR QL STRIP: ABNORMAL
VIT B12 BLD-MCNC: 618 PG/ML (ref 211–946)
VLDLC SERPL-MCNC: 14.4 MG/DL (ref 5–40)
WBC CLUMPS # UR AUTO: ABNORMAL /HPF
WBC NRBC COR # BLD: 10.78 10*3/MM3 (ref 4.5–10.7)
WBC NRBC COR # BLD: 6.68 10*3/MM3 (ref 4.5–10.7)
WBC NRBC COR # BLD: 7.06 10*3/MM3 (ref 4.5–10.7)
WBC NRBC COR # BLD: 7.15 10*3/MM3 (ref 4.5–10.7)
WBC NRBC COR # BLD: 9.76 10*3/MM3 (ref 4.5–10.7)
WBC UR QL AUTO: ABNORMAL /HPF
WBC UR QL AUTO: ABNORMAL /HPF
WHOLE BLOOD HOLD SPECIMEN: NORMAL

## 2018-01-01 PROCEDURE — 83735 ASSAY OF MAGNESIUM: CPT | Performed by: EMERGENCY MEDICINE

## 2018-01-01 PROCEDURE — 25010000002 LORAZEPAM PER 2 MG: Performed by: INTERNAL MEDICINE

## 2018-01-01 PROCEDURE — 81001 URINALYSIS AUTO W/SCOPE: CPT | Performed by: EMERGENCY MEDICINE

## 2018-01-01 PROCEDURE — 85025 COMPLETE CBC W/AUTO DIFF WBC: CPT | Performed by: HOSPITALIST

## 2018-01-01 PROCEDURE — 25010000002 MORPHINE PER 10 MG: Performed by: INTERNAL MEDICINE

## 2018-01-01 PROCEDURE — 70450 CT HEAD/BRAIN W/O DYE: CPT

## 2018-01-01 PROCEDURE — 93005 ELECTROCARDIOGRAM TRACING: CPT | Performed by: EMERGENCY MEDICINE

## 2018-01-01 PROCEDURE — 85027 COMPLETE CBC AUTOMATED: CPT | Performed by: HOSPITALIST

## 2018-01-01 PROCEDURE — 83605 ASSAY OF LACTIC ACID: CPT | Performed by: EMERGENCY MEDICINE

## 2018-01-01 PROCEDURE — 83735 ASSAY OF MAGNESIUM: CPT

## 2018-01-01 PROCEDURE — 87186 SC STD MICRODIL/AGAR DIL: CPT | Performed by: INTERNAL MEDICINE

## 2018-01-01 PROCEDURE — 25010000002 CEFTRIAXONE PER 250 MG: Performed by: EMERGENCY MEDICINE

## 2018-01-01 PROCEDURE — 92610 EVALUATE SWALLOWING FUNCTION: CPT | Performed by: SPEECH-LANGUAGE PATHOLOGIST

## 2018-01-01 PROCEDURE — 83735 ASSAY OF MAGNESIUM: CPT | Performed by: INTERNAL MEDICINE

## 2018-01-01 PROCEDURE — 85025 COMPLETE CBC W/AUTO DIFF WBC: CPT

## 2018-01-01 PROCEDURE — 25010000002 CEFTRIAXONE PER 250 MG: Performed by: INTERNAL MEDICINE

## 2018-01-01 PROCEDURE — 93005 ELECTROCARDIOGRAM TRACING: CPT | Performed by: HOSPITALIST

## 2018-01-01 PROCEDURE — 71045 X-RAY EXAM CHEST 1 VIEW: CPT

## 2018-01-01 PROCEDURE — 82962 GLUCOSE BLOOD TEST: CPT

## 2018-01-01 PROCEDURE — 25810000003 SODIUM CHLORIDE 0.9 % WITH KCL 20 MEQ 20-0.9 MEQ/L-% SOLUTION: Performed by: PHYSICIAN ASSISTANT

## 2018-01-01 PROCEDURE — 25810000003 SODIUM CHLORIDE 0.9 % WITH KCL 20 MEQ 20-0.9 MEQ/L-% SOLUTION: Performed by: PSYCHIATRY & NEUROLOGY

## 2018-01-01 PROCEDURE — 93306 TTE W/DOPPLER COMPLETE: CPT | Performed by: INTERNAL MEDICINE

## 2018-01-01 PROCEDURE — 97110 THERAPEUTIC EXERCISES: CPT

## 2018-01-01 PROCEDURE — 93306 TTE W/DOPPLER COMPLETE: CPT

## 2018-01-01 PROCEDURE — 80069 RENAL FUNCTION PANEL: CPT | Performed by: HOSPITALIST

## 2018-01-01 PROCEDURE — 80048 BASIC METABOLIC PNL TOTAL CA: CPT

## 2018-01-01 PROCEDURE — 97161 PT EVAL LOW COMPLEX 20 MIN: CPT

## 2018-01-01 PROCEDURE — 36415 COLL VENOUS BLD VENIPUNCTURE: CPT | Performed by: HOSPITALIST

## 2018-01-01 PROCEDURE — 93005 ELECTROCARDIOGRAM TRACING: CPT

## 2018-01-01 PROCEDURE — 93005 ELECTROCARDIOGRAM TRACING: CPT | Performed by: INTERNAL MEDICINE

## 2018-01-01 PROCEDURE — 93010 ELECTROCARDIOGRAM REPORT: CPT | Performed by: INTERNAL MEDICINE

## 2018-01-01 PROCEDURE — 99233 SBSQ HOSP IP/OBS HIGH 50: CPT | Performed by: NURSE PRACTITIONER

## 2018-01-01 PROCEDURE — 92610 EVALUATE SWALLOWING FUNCTION: CPT

## 2018-01-01 PROCEDURE — 85610 PROTHROMBIN TIME: CPT | Performed by: EMERGENCY MEDICINE

## 2018-01-01 PROCEDURE — 84443 ASSAY THYROID STIM HORMONE: CPT | Performed by: INTERNAL MEDICINE

## 2018-01-01 PROCEDURE — 71046 X-RAY EXAM CHEST 2 VIEWS: CPT

## 2018-01-01 PROCEDURE — 99285 EMERGENCY DEPT VISIT HI MDM: CPT

## 2018-01-01 PROCEDURE — 84145 PROCALCITONIN (PCT): CPT | Performed by: EMERGENCY MEDICINE

## 2018-01-01 PROCEDURE — 87186 SC STD MICRODIL/AGAR DIL: CPT | Performed by: EMERGENCY MEDICINE

## 2018-01-01 PROCEDURE — 87086 URINE CULTURE/COLONY COUNT: CPT | Performed by: INTERNAL MEDICINE

## 2018-01-01 PROCEDURE — 84484 ASSAY OF TROPONIN QUANT: CPT | Performed by: EMERGENCY MEDICINE

## 2018-01-01 PROCEDURE — 87040 BLOOD CULTURE FOR BACTERIA: CPT | Performed by: EMERGENCY MEDICINE

## 2018-01-01 PROCEDURE — 80048 BASIC METABOLIC PNL TOTAL CA: CPT | Performed by: INTERNAL MEDICINE

## 2018-01-01 PROCEDURE — 80053 COMPREHEN METABOLIC PANEL: CPT | Performed by: HOSPITALIST

## 2018-01-01 PROCEDURE — 97162 PT EVAL MOD COMPLEX 30 MIN: CPT

## 2018-01-01 PROCEDURE — 84443 ASSAY THYROID STIM HORMONE: CPT | Performed by: PSYCHIATRY & NEUROLOGY

## 2018-01-01 PROCEDURE — 84443 ASSAY THYROID STIM HORMONE: CPT | Performed by: EMERGENCY MEDICINE

## 2018-01-01 PROCEDURE — 25010000002 ENOXAPARIN PER 10 MG: Performed by: INTERNAL MEDICINE

## 2018-01-01 PROCEDURE — 82607 VITAMIN B-12: CPT | Performed by: PSYCHIATRY & NEUROLOGY

## 2018-01-01 PROCEDURE — 80053 COMPREHEN METABOLIC PANEL: CPT

## 2018-01-01 PROCEDURE — 87077 CULTURE AEROBIC IDENTIFY: CPT | Performed by: EMERGENCY MEDICINE

## 2018-01-01 PROCEDURE — 72110 X-RAY EXAM L-2 SPINE 4/>VWS: CPT

## 2018-01-01 PROCEDURE — 84484 ASSAY OF TROPONIN QUANT: CPT

## 2018-01-01 PROCEDURE — 84484 ASSAY OF TROPONIN QUANT: CPT | Performed by: HOSPITALIST

## 2018-01-01 PROCEDURE — 80053 COMPREHEN METABOLIC PANEL: CPT | Performed by: EMERGENCY MEDICINE

## 2018-01-01 PROCEDURE — 87086 URINE CULTURE/COLONY COUNT: CPT | Performed by: EMERGENCY MEDICINE

## 2018-01-01 PROCEDURE — 25010000002 HYDRALAZINE PER 20 MG: Performed by: INTERNAL MEDICINE

## 2018-01-01 PROCEDURE — 99222 1ST HOSP IP/OBS MODERATE 55: CPT | Performed by: PSYCHIATRY & NEUROLOGY

## 2018-01-01 PROCEDURE — 83036 HEMOGLOBIN GLYCOSYLATED A1C: CPT | Performed by: HOSPITALIST

## 2018-01-01 PROCEDURE — 92526 ORAL FUNCTION THERAPY: CPT

## 2018-01-01 PROCEDURE — 80061 LIPID PANEL: CPT | Performed by: HOSPITALIST

## 2018-01-01 PROCEDURE — 85025 COMPLETE CBC W/AUTO DIFF WBC: CPT | Performed by: EMERGENCY MEDICINE

## 2018-01-01 RX ORDER — SODIUM CHLORIDE AND POTASSIUM CHLORIDE 150; 900 MG/100ML; MG/100ML
75 INJECTION, SOLUTION INTRAVENOUS CONTINUOUS
Status: DISCONTINUED | OUTPATIENT
Start: 2018-01-01 | End: 2018-01-01

## 2018-01-01 RX ORDER — LEVOTHYROXINE SODIUM 0.03 MG/1
25 TABLET ORAL
Status: DISCONTINUED | OUTPATIENT
Start: 2018-01-01 | End: 2018-01-01 | Stop reason: HOSPADM

## 2018-01-01 RX ORDER — HALOPERIDOL 2 MG/ML
1 SOLUTION ORAL EVERY 6 HOURS PRN
Status: DISCONTINUED | OUTPATIENT
Start: 2018-01-01 | End: 2018-01-01 | Stop reason: HOSPADM

## 2018-01-01 RX ORDER — ACETAMINOPHEN 650 MG/1
650 SUPPOSITORY RECTAL EVERY 4 HOURS PRN
Status: CANCELLED | OUTPATIENT
Start: 2018-01-01

## 2018-01-01 RX ORDER — LISINOPRIL 10 MG/1
10 TABLET ORAL DAILY
Status: ON HOLD | COMMUNITY
End: 2018-01-01

## 2018-01-01 RX ORDER — SODIUM CHLORIDE 0.9 % (FLUSH) 0.9 %
1-10 SYRINGE (ML) INJECTION AS NEEDED
Status: DISCONTINUED | OUTPATIENT
Start: 2018-01-01 | End: 2018-01-01 | Stop reason: HOSPADM

## 2018-01-01 RX ORDER — SODIUM CHLORIDE 9 MG/ML
75 INJECTION, SOLUTION INTRAVENOUS CONTINUOUS
Status: DISCONTINUED | OUTPATIENT
Start: 2018-01-01 | End: 2018-01-01

## 2018-01-01 RX ORDER — MELATONIN
1000 DAILY
Status: DISCONTINUED | OUTPATIENT
Start: 2018-01-01 | End: 2018-01-01 | Stop reason: HOSPADM

## 2018-01-01 RX ORDER — HALOPERIDOL 5 MG/ML
1 INJECTION INTRAMUSCULAR EVERY 6 HOURS PRN
Status: DISCONTINUED | OUTPATIENT
Start: 2018-01-01 | End: 2018-01-01 | Stop reason: HOSPADM

## 2018-01-01 RX ORDER — LORAZEPAM 2 MG/ML
0.5 INJECTION INTRAMUSCULAR
Status: DISCONTINUED | OUTPATIENT
Start: 2018-01-01 | End: 2018-01-01 | Stop reason: HOSPADM

## 2018-01-01 RX ORDER — CARVEDILOL 12.5 MG/1
12.5 TABLET ORAL 2 TIMES DAILY WITH MEALS
Status: DISCONTINUED | OUTPATIENT
Start: 2018-01-01 | End: 2018-01-01

## 2018-01-01 RX ORDER — ACETAMINOPHEN 650 MG/1
650 SUPPOSITORY RECTAL EVERY 4 HOURS PRN
Status: DISCONTINUED | OUTPATIENT
Start: 2018-01-01 | End: 2018-01-01 | Stop reason: HOSPADM

## 2018-01-01 RX ORDER — LORAZEPAM 2 MG/ML
2 INJECTION INTRAMUSCULAR
Status: CANCELLED | OUTPATIENT
Start: 2018-01-01 | End: 2018-05-10

## 2018-01-01 RX ORDER — MORPHINE SULFATE 2 MG/ML
2 INJECTION, SOLUTION INTRAMUSCULAR; INTRAVENOUS
Status: CANCELLED | OUTPATIENT
Start: 2018-01-01 | End: 2018-05-10

## 2018-01-01 RX ORDER — SODIUM CHLORIDE 0.9 % (FLUSH) 0.9 %
10 SYRINGE (ML) INJECTION AS NEEDED
Status: DISCONTINUED | OUTPATIENT
Start: 2018-01-01 | End: 2018-01-01 | Stop reason: HOSPADM

## 2018-01-01 RX ORDER — MORPHINE SULFATE 20 MG/ML
5 SOLUTION ORAL
Status: CANCELLED | OUTPATIENT
Start: 2018-01-01 | End: 2018-05-10

## 2018-01-01 RX ORDER — MORPHINE SULFATE 20 MG/ML
5 SOLUTION ORAL
Status: DISCONTINUED | OUTPATIENT
Start: 2018-01-01 | End: 2018-01-01 | Stop reason: HOSPADM

## 2018-01-01 RX ORDER — ACETAMINOPHEN 160 MG/5ML
650 SOLUTION ORAL EVERY 4 HOURS PRN
Status: DISCONTINUED | OUTPATIENT
Start: 2018-01-01 | End: 2018-01-01 | Stop reason: HOSPADM

## 2018-01-01 RX ORDER — CARVEDILOL 3.12 MG/1
3.12 TABLET ORAL 2 TIMES DAILY WITH MEALS
Status: DISCONTINUED | OUTPATIENT
Start: 2018-01-01 | End: 2018-01-01

## 2018-01-01 RX ORDER — CARVEDILOL 6.25 MG/1
6.25 TABLET ORAL 2 TIMES DAILY WITH MEALS
Status: DISCONTINUED | OUTPATIENT
Start: 2018-01-01 | End: 2018-01-01

## 2018-01-01 RX ORDER — AMLODIPINE BESYLATE 10 MG/1
10 TABLET ORAL
Status: DISCONTINUED | OUTPATIENT
Start: 2018-01-01 | End: 2018-01-01 | Stop reason: HOSPADM

## 2018-01-01 RX ORDER — ACETAMINOPHEN 325 MG/1
650 TABLET ORAL EVERY 4 HOURS PRN
Status: DISCONTINUED | OUTPATIENT
Start: 2018-01-01 | End: 2018-01-01 | Stop reason: HOSPADM

## 2018-01-01 RX ORDER — HYDROMORPHONE HCL 110MG/55ML
0.5 PATIENT CONTROLLED ANALGESIA SYRINGE INTRAVENOUS
Status: DISCONTINUED | OUTPATIENT
Start: 2018-01-01 | End: 2018-01-01 | Stop reason: HOSPADM

## 2018-01-01 RX ORDER — CARVEDILOL 25 MG/1
25 TABLET ORAL 2 TIMES DAILY WITH MEALS
Qty: 60 TABLET | Refills: 2 | Status: ON HOLD | OUTPATIENT
Start: 2018-01-01 | End: 2018-01-01

## 2018-01-01 RX ORDER — CLOPIDOGREL BISULFATE 75 MG/1
75 TABLET ORAL DAILY
Status: DISCONTINUED | OUTPATIENT
Start: 2018-01-01 | End: 2018-01-01

## 2018-01-01 RX ORDER — LORAZEPAM 2 MG/ML
0.5 CONCENTRATE ORAL
Status: CANCELLED | OUTPATIENT
Start: 2018-01-01 | End: 2018-05-10

## 2018-01-01 RX ORDER — LORAZEPAM 2 MG/ML
1 INJECTION INTRAMUSCULAR
Status: DISCONTINUED | OUTPATIENT
Start: 2018-01-01 | End: 2018-01-01 | Stop reason: HOSPADM

## 2018-01-01 RX ORDER — HYDROMORPHONE HCL 110MG/55ML
0.5 PATIENT CONTROLLED ANALGESIA SYRINGE INTRAVENOUS
Status: DISCONTINUED | OUTPATIENT
Start: 2018-01-01 | End: 2018-01-01

## 2018-01-01 RX ORDER — FAMOTIDINE 10 MG/ML
20 INJECTION, SOLUTION INTRAVENOUS EVERY 12 HOURS SCHEDULED
Status: DISCONTINUED | OUTPATIENT
Start: 2018-01-01 | End: 2018-01-01

## 2018-01-01 RX ORDER — LORAZEPAM 2 MG/ML
2 CONCENTRATE ORAL
Status: DISCONTINUED | OUTPATIENT
Start: 2018-01-01 | End: 2018-01-01 | Stop reason: HOSPADM

## 2018-01-01 RX ORDER — FAMOTIDINE 20 MG/1
20 TABLET, FILM COATED ORAL 2 TIMES DAILY
Status: DISCONTINUED | OUTPATIENT
Start: 2018-01-01 | End: 2018-01-01

## 2018-01-01 RX ORDER — LABETALOL HYDROCHLORIDE 5 MG/ML
10 INJECTION, SOLUTION INTRAVENOUS ONCE
Status: COMPLETED | OUTPATIENT
Start: 2018-01-01 | End: 2018-01-01

## 2018-01-01 RX ORDER — ASPIRIN 81 MG/1
81 TABLET ORAL DAILY
Status: DISCONTINUED | OUTPATIENT
Start: 2018-01-01 | End: 2018-01-01 | Stop reason: HOSPADM

## 2018-01-01 RX ORDER — LORAZEPAM 2 MG/ML
2 INJECTION INTRAMUSCULAR
Status: DISCONTINUED | OUTPATIENT
Start: 2018-01-01 | End: 2018-01-01 | Stop reason: HOSPADM

## 2018-01-01 RX ORDER — ONDANSETRON 2 MG/ML
4 INJECTION INTRAMUSCULAR; INTRAVENOUS EVERY 6 HOURS PRN
Status: CANCELLED | OUTPATIENT
Start: 2018-01-01

## 2018-01-01 RX ORDER — HALOPERIDOL 1 MG/1
1 TABLET ORAL EVERY 6 HOURS PRN
Status: DISCONTINUED | OUTPATIENT
Start: 2018-01-01 | End: 2018-01-01 | Stop reason: HOSPADM

## 2018-01-01 RX ORDER — ASPIRIN 81 MG/1
81 TABLET ORAL DAILY
Status: DISCONTINUED | OUTPATIENT
Start: 2018-01-01 | End: 2018-01-01

## 2018-01-01 RX ORDER — MORPHINE SULFATE 20 MG/ML
10 SOLUTION ORAL
Status: DISCONTINUED | OUTPATIENT
Start: 2018-01-01 | End: 2018-01-01 | Stop reason: HOSPADM

## 2018-01-01 RX ORDER — MORPHINE SULFATE 20 MG/ML
10 SOLUTION ORAL
Status: CANCELLED | OUTPATIENT
Start: 2018-01-01 | End: 2018-05-10

## 2018-01-01 RX ORDER — SODIUM CHLORIDE 0.9 % (FLUSH) 0.9 %
1-10 SYRINGE (ML) INJECTION AS NEEDED
Status: CANCELLED | OUTPATIENT
Start: 2018-01-01

## 2018-01-01 RX ORDER — LEVOTHYROXINE SODIUM 0.03 MG/1
25 TABLET ORAL
Status: DISCONTINUED | OUTPATIENT
Start: 2018-01-01 | End: 2018-01-01

## 2018-01-01 RX ORDER — ACETAMINOPHEN 500 MG
500 TABLET ORAL EVERY 4 HOURS PRN
Status: ON HOLD | COMMUNITY
End: 2018-01-01

## 2018-01-01 RX ORDER — LORAZEPAM 2 MG/ML
1 CONCENTRATE ORAL
Status: CANCELLED | OUTPATIENT
Start: 2018-01-01 | End: 2018-05-10

## 2018-01-01 RX ORDER — LORAZEPAM 2 MG/ML
1 CONCENTRATE ORAL
Status: DISCONTINUED | OUTPATIENT
Start: 2018-01-01 | End: 2018-01-01 | Stop reason: HOSPADM

## 2018-01-01 RX ORDER — CARVEDILOL 25 MG/1
25 TABLET ORAL 2 TIMES DAILY WITH MEALS
Status: DISCONTINUED | OUTPATIENT
Start: 2018-01-01 | End: 2018-01-01 | Stop reason: HOSPADM

## 2018-01-01 RX ORDER — LORAZEPAM 2 MG/ML
0.5 CONCENTRATE ORAL
Status: DISCONTINUED | OUTPATIENT
Start: 2018-01-01 | End: 2018-01-01 | Stop reason: HOSPADM

## 2018-01-01 RX ORDER — SODIUM CHLORIDE AND POTASSIUM CHLORIDE 150; 900 MG/100ML; MG/100ML
500 INJECTION, SOLUTION INTRAVENOUS ONCE
Status: COMPLETED | OUTPATIENT
Start: 2018-01-01 | End: 2018-01-01

## 2018-01-01 RX ORDER — LISINOPRIL 10 MG/1
10 TABLET ORAL DAILY
Status: DISCONTINUED | OUTPATIENT
Start: 2018-01-01 | End: 2018-01-01

## 2018-01-01 RX ORDER — HYDRALAZINE HYDROCHLORIDE 20 MG/ML
10 INJECTION INTRAMUSCULAR; INTRAVENOUS EVERY 4 HOURS PRN
Status: CANCELLED | OUTPATIENT
Start: 2018-01-01

## 2018-01-01 RX ORDER — DIPHENOXYLATE HYDROCHLORIDE AND ATROPINE SULFATE 2.5; .025 MG/1; MG/1
1 TABLET ORAL
Status: DISCONTINUED | OUTPATIENT
Start: 2018-01-01 | End: 2018-01-01 | Stop reason: HOSPADM

## 2018-01-01 RX ORDER — QUETIAPINE FUMARATE 25 MG/1
12.5 TABLET, FILM COATED ORAL NIGHTLY
Status: DISCONTINUED | OUTPATIENT
Start: 2018-01-01 | End: 2018-01-01

## 2018-01-01 RX ORDER — ASPIRIN 325 MG
325 TABLET ORAL DAILY
Status: DISCONTINUED | OUTPATIENT
Start: 2018-01-01 | End: 2018-01-01

## 2018-01-01 RX ORDER — HYDROMORPHONE HCL 110MG/55ML
0.5 PATIENT CONTROLLED ANALGESIA SYRINGE INTRAVENOUS
Status: CANCELLED | OUTPATIENT
Start: 2018-01-01 | End: 2018-05-10

## 2018-01-01 RX ORDER — HALOPERIDOL 5 MG/ML
1 INJECTION INTRAMUSCULAR EVERY 6 HOURS PRN
Status: CANCELLED | OUTPATIENT
Start: 2018-01-01

## 2018-01-01 RX ORDER — AMLODIPINE BESYLATE 10 MG/1
10 TABLET ORAL ONCE
Status: COMPLETED | OUTPATIENT
Start: 2018-01-01 | End: 2018-01-01

## 2018-01-01 RX ORDER — CARVEDILOL 25 MG/1
25 TABLET ORAL 2 TIMES DAILY WITH MEALS
Status: DISCONTINUED | OUTPATIENT
Start: 2018-01-01 | End: 2018-01-01

## 2018-01-01 RX ORDER — DIPHENOXYLATE HYDROCHLORIDE AND ATROPINE SULFATE 2.5; .025 MG/1; MG/1
1 TABLET ORAL
Status: CANCELLED | OUTPATIENT
Start: 2018-01-01

## 2018-01-01 RX ORDER — ACETAMINOPHEN 325 MG/1
650 TABLET ORAL EVERY 4 HOURS PRN
Status: CANCELLED | OUTPATIENT
Start: 2018-01-01

## 2018-01-01 RX ORDER — LISINOPRIL 20 MG/1
20 TABLET ORAL DAILY
Qty: 30 TABLET | Refills: 2 | Status: ON HOLD | OUTPATIENT
Start: 2018-01-01 | End: 2018-01-01

## 2018-01-01 RX ORDER — ATORVASTATIN CALCIUM 80 MG/1
80 TABLET, FILM COATED ORAL NIGHTLY
Status: DISCONTINUED | OUTPATIENT
Start: 2018-01-01 | End: 2018-01-01

## 2018-01-01 RX ORDER — ATORVASTATIN CALCIUM 10 MG/1
10 TABLET, FILM COATED ORAL DAILY
Status: DISCONTINUED | OUTPATIENT
Start: 2018-01-01 | End: 2018-01-01 | Stop reason: HOSPADM

## 2018-01-01 RX ORDER — ASPIRIN 300 MG/1
300 SUPPOSITORY RECTAL DAILY
Status: DISCONTINUED | OUTPATIENT
Start: 2018-01-01 | End: 2018-01-01

## 2018-01-01 RX ORDER — DEXTROSE, SODIUM CHLORIDE, AND POTASSIUM CHLORIDE 5; .45; .15 G/100ML; G/100ML; G/100ML
75 INJECTION INTRAVENOUS CONTINUOUS
Status: DISCONTINUED | OUTPATIENT
Start: 2018-01-01 | End: 2018-01-01

## 2018-01-01 RX ORDER — DONEPEZIL HYDROCHLORIDE 5 MG/1
5 TABLET, FILM COATED ORAL NIGHTLY
Status: DISCONTINUED | OUTPATIENT
Start: 2018-01-01 | End: 2018-01-01 | Stop reason: HOSPADM

## 2018-01-01 RX ORDER — LISINOPRIL 20 MG/1
20 TABLET ORAL DAILY
Status: DISCONTINUED | OUTPATIENT
Start: 2018-01-01 | End: 2018-01-01 | Stop reason: HOSPADM

## 2018-01-01 RX ORDER — AMLODIPINE BESYLATE 10 MG/1
10 TABLET ORAL
Qty: 30 TABLET | Refills: 2 | Status: ON HOLD | OUTPATIENT
Start: 2018-01-01 | End: 2018-01-01

## 2018-01-01 RX ORDER — HALOPERIDOL 2 MG/ML
1 SOLUTION ORAL EVERY 6 HOURS PRN
Status: CANCELLED | OUTPATIENT
Start: 2018-01-01

## 2018-01-01 RX ORDER — SODIUM CHLORIDE 0.9 % (FLUSH) 0.9 %
10 SYRINGE (ML) INJECTION AS NEEDED
Status: DISCONTINUED | OUTPATIENT
Start: 2018-01-01 | End: 2018-01-01

## 2018-01-01 RX ORDER — MORPHINE SULFATE 2 MG/ML
2 INJECTION, SOLUTION INTRAMUSCULAR; INTRAVENOUS
Status: DISCONTINUED | OUTPATIENT
Start: 2018-01-01 | End: 2018-01-01

## 2018-01-01 RX ORDER — ONDANSETRON 2 MG/ML
4 INJECTION INTRAMUSCULAR; INTRAVENOUS EVERY 6 HOURS PRN
Status: DISCONTINUED | OUTPATIENT
Start: 2018-01-01 | End: 2018-01-01 | Stop reason: HOSPADM

## 2018-01-01 RX ORDER — AMLODIPINE BESYLATE 5 MG/1
5 TABLET ORAL
Status: DISCONTINUED | OUTPATIENT
Start: 2018-01-01 | End: 2018-01-01

## 2018-01-01 RX ORDER — HYDRALAZINE HYDROCHLORIDE 20 MG/ML
10 INJECTION INTRAMUSCULAR; INTRAVENOUS EVERY 4 HOURS PRN
Status: DISCONTINUED | OUTPATIENT
Start: 2018-01-01 | End: 2018-01-01 | Stop reason: HOSPADM

## 2018-01-01 RX ORDER — ATORVASTATIN CALCIUM 20 MG/1
40 TABLET, FILM COATED ORAL NIGHTLY
Status: DISCONTINUED | OUTPATIENT
Start: 2018-01-01 | End: 2018-01-01

## 2018-01-01 RX ORDER — MEMANTINE HYDROCHLORIDE 10 MG/1
10 TABLET ORAL EVERY 12 HOURS SCHEDULED
Status: DISCONTINUED | OUTPATIENT
Start: 2018-01-01 | End: 2018-01-01 | Stop reason: HOSPADM

## 2018-01-01 RX ORDER — LORAZEPAM 2 MG/ML
2 CONCENTRATE ORAL
Status: CANCELLED | OUTPATIENT
Start: 2018-01-01 | End: 2018-05-10

## 2018-01-01 RX ORDER — LORAZEPAM 2 MG/ML
0.5 INJECTION INTRAMUSCULAR
Status: CANCELLED | OUTPATIENT
Start: 2018-01-01 | End: 2018-05-10

## 2018-01-01 RX ORDER — HALOPERIDOL 1 MG/1
1 TABLET ORAL EVERY 6 HOURS PRN
Status: CANCELLED | OUTPATIENT
Start: 2018-01-01

## 2018-01-01 RX ORDER — GLYCOPYRROLATE 0.2 MG/ML
0.4 INJECTION INTRAMUSCULAR; INTRAVENOUS
Status: DISCONTINUED | OUTPATIENT
Start: 2018-01-01 | End: 2018-01-01 | Stop reason: HOSPADM

## 2018-01-01 RX ORDER — MIRTAZAPINE 15 MG/1
15 TABLET, FILM COATED ORAL NIGHTLY
Status: ON HOLD | COMMUNITY
End: 2018-01-01

## 2018-01-01 RX ORDER — LEVOTHYROXINE SODIUM 0.03 MG/1
25 TABLET ORAL DAILY
Status: ON HOLD | COMMUNITY
End: 2018-01-01

## 2018-01-01 RX ORDER — MORPHINE SULFATE 2 MG/ML
2 INJECTION, SOLUTION INTRAMUSCULAR; INTRAVENOUS
Status: DISCONTINUED | OUTPATIENT
Start: 2018-01-01 | End: 2018-01-01 | Stop reason: HOSPADM

## 2018-01-01 RX ORDER — ACETAMINOPHEN 160 MG/5ML
650 SOLUTION ORAL EVERY 4 HOURS PRN
Status: CANCELLED | OUTPATIENT
Start: 2018-01-01

## 2018-01-01 RX ORDER — GLYCOPYRROLATE 0.2 MG/ML
0.2 INJECTION INTRAMUSCULAR; INTRAVENOUS
Status: DISCONTINUED | OUTPATIENT
Start: 2018-01-01 | End: 2018-01-01 | Stop reason: HOSPADM

## 2018-01-01 RX ORDER — CEFTRIAXONE SODIUM 1 G/50ML
1 INJECTION, SOLUTION INTRAVENOUS EVERY 24 HOURS
Status: COMPLETED | OUTPATIENT
Start: 2018-01-01 | End: 2018-01-01

## 2018-01-01 RX ORDER — LORAZEPAM 2 MG/ML
1 INJECTION INTRAMUSCULAR
Status: CANCELLED | OUTPATIENT
Start: 2018-01-01 | End: 2018-05-10

## 2018-01-01 RX ORDER — MORPHINE SULFATE 20 MG/ML
5 SOLUTION ORAL
Status: DISCONTINUED | OUTPATIENT
Start: 2018-01-01 | End: 2018-01-01

## 2018-01-01 RX ADMIN — QUETIAPINE FUMARATE 12.5 MG: 25 TABLET, FILM COATED ORAL at 20:19

## 2018-01-01 RX ADMIN — LORAZEPAM 0.5 MG: 2 INJECTION INTRAMUSCULAR; INTRAVENOUS at 04:22

## 2018-01-01 RX ADMIN — CLOPIDOGREL 75 MG: 75 TABLET, FILM COATED ORAL at 09:15

## 2018-01-01 RX ADMIN — ASPIRIN 81 MG: 81 TABLET ORAL at 08:38

## 2018-01-01 RX ADMIN — ASPIRIN 81 MG: 81 TABLET ORAL at 08:17

## 2018-01-01 RX ADMIN — POTASSIUM CHLORIDE AND SODIUM CHLORIDE 75 ML/HR: 900; 150 INJECTION, SOLUTION INTRAVENOUS at 07:31

## 2018-01-01 RX ADMIN — AMLODIPINE BESYLATE 5 MG: 5 TABLET ORAL at 12:48

## 2018-01-01 RX ADMIN — CEFTRIAXONE SODIUM 1 G: 1 INJECTION, SOLUTION INTRAVENOUS at 20:21

## 2018-01-01 RX ADMIN — POTASSIUM CHLORIDE AND SODIUM CHLORIDE 75 ML/HR: 900; 150 INJECTION, SOLUTION INTRAVENOUS at 01:43

## 2018-01-01 RX ADMIN — LORAZEPAM 0.5 MG: 2 INJECTION INTRAMUSCULAR; INTRAVENOUS at 12:04

## 2018-01-01 RX ADMIN — MEMANTINE HYDROCHLORIDE 10 MG: 10 TABLET, FILM COATED ORAL at 08:17

## 2018-01-01 RX ADMIN — FAMOTIDINE 20 MG: 10 INJECTION, SOLUTION INTRAVENOUS at 19:35

## 2018-01-01 RX ADMIN — MORPHINE SULFATE 2 MG: 2 INJECTION, SOLUTION INTRAMUSCULAR; INTRAVENOUS at 21:27

## 2018-01-01 RX ADMIN — ASPIRIN 300 MG: 300 SUPPOSITORY RECTAL at 10:24

## 2018-01-01 RX ADMIN — MEMANTINE HYDROCHLORIDE 10 MG: 10 TABLET, FILM COATED ORAL at 08:38

## 2018-01-01 RX ADMIN — AMLODIPINE BESYLATE 10 MG: 10 TABLET ORAL at 10:39

## 2018-01-01 RX ADMIN — ATORVASTATIN CALCIUM 10 MG: 10 TABLET, FILM COATED ORAL at 08:17

## 2018-01-01 RX ADMIN — MORPHINE SULFATE 4 MG: 4 INJECTION, SOLUTION INTRAMUSCULAR; INTRAVENOUS at 00:22

## 2018-01-01 RX ADMIN — FAMOTIDINE 20 MG: 10 INJECTION, SOLUTION INTRAVENOUS at 08:47

## 2018-01-01 RX ADMIN — LISINOPRIL 10 MG: 10 TABLET ORAL at 08:08

## 2018-01-01 RX ADMIN — CEFTRIAXONE SODIUM 1 G: 1 INJECTION, SOLUTION INTRAVENOUS at 18:44

## 2018-01-01 RX ADMIN — CARVEDILOL 12.5 MG: 12.5 TABLET, FILM COATED ORAL at 13:40

## 2018-01-01 RX ADMIN — DONEPEZIL HYDROCHLORIDE 5 MG: 5 TABLET, FILM COATED ORAL at 20:26

## 2018-01-01 RX ADMIN — MORPHINE SULFATE 2 MG: 2 INJECTION, SOLUTION INTRAMUSCULAR; INTRAVENOUS at 20:40

## 2018-01-01 RX ADMIN — VITAMIN D, TAB 1000IU (100/BT) 1000 UNITS: 25 TAB at 08:17

## 2018-01-01 RX ADMIN — FAMOTIDINE 20 MG: 10 INJECTION, SOLUTION INTRAVENOUS at 09:01

## 2018-01-01 RX ADMIN — CARVEDILOL 25 MG: 25 TABLET, FILM COATED ORAL at 10:39

## 2018-01-01 RX ADMIN — MORPHINE SULFATE 2 MG: 2 INJECTION, SOLUTION INTRAMUSCULAR; INTRAVENOUS at 08:22

## 2018-01-01 RX ADMIN — LEVOTHYROXINE SODIUM 25 MCG: 25 TABLET ORAL at 06:59

## 2018-01-01 RX ADMIN — AMLODIPINE BESYLATE 10 MG: 10 TABLET ORAL at 03:48

## 2018-01-01 RX ADMIN — LORAZEPAM 0.5 MG: 2 INJECTION INTRAMUSCULAR; INTRAVENOUS at 12:28

## 2018-01-01 RX ADMIN — LORAZEPAM 0.5 MG: 2 INJECTION INTRAMUSCULAR; INTRAVENOUS at 20:45

## 2018-01-01 RX ADMIN — CARVEDILOL 25 MG: 25 TABLET, FILM COATED ORAL at 16:56

## 2018-01-01 RX ADMIN — DONEPEZIL HYDROCHLORIDE 5 MG: 5 TABLET, FILM COATED ORAL at 20:21

## 2018-01-01 RX ADMIN — MORPHINE SULFATE 2 MG: 2 INJECTION, SOLUTION INTRAMUSCULAR; INTRAVENOUS at 11:20

## 2018-01-01 RX ADMIN — LORAZEPAM 0.5 MG: 2 INJECTION INTRAMUSCULAR; INTRAVENOUS at 15:48

## 2018-01-01 RX ADMIN — AMLODIPINE BESYLATE 5 MG: 5 TABLET ORAL at 09:00

## 2018-01-01 RX ADMIN — MORPHINE SULFATE 2 MG: 4 INJECTION, SOLUTION INTRAMUSCULAR; INTRAVENOUS at 12:04

## 2018-01-01 RX ADMIN — GLYCOPYRROLATE 0.4 MG: 0.2 INJECTION INTRAMUSCULAR; INTRAVENOUS at 00:40

## 2018-01-01 RX ADMIN — VITAMIN D, TAB 1000IU (100/BT) 1000 UNITS: 25 TAB at 08:08

## 2018-01-01 RX ADMIN — MORPHINE SULFATE 2 MG: 2 INJECTION, SOLUTION INTRAMUSCULAR; INTRAVENOUS at 17:17

## 2018-01-01 RX ADMIN — MORPHINE SULFATE 2 MG: 2 INJECTION, SOLUTION INTRAMUSCULAR; INTRAVENOUS at 20:11

## 2018-01-01 RX ADMIN — MORPHINE SULFATE 2 MG: 2 INJECTION, SOLUTION INTRAMUSCULAR; INTRAVENOUS at 00:35

## 2018-01-01 RX ADMIN — POTASSIUM CHLORIDE AND SODIUM CHLORIDE 500 ML/HR: 900; 150 INJECTION, SOLUTION INTRAVENOUS at 22:04

## 2018-01-01 RX ADMIN — MORPHINE SULFATE 2 MG: 2 INJECTION, SOLUTION INTRAMUSCULAR; INTRAVENOUS at 05:33

## 2018-01-01 RX ADMIN — MEMANTINE HYDROCHLORIDE 10 MG: 10 TABLET, FILM COATED ORAL at 08:55

## 2018-01-01 RX ADMIN — MORPHINE SULFATE 4 MG: 4 INJECTION, SOLUTION INTRAMUSCULAR; INTRAVENOUS at 09:41

## 2018-01-01 RX ADMIN — POTASSIUM CHLORIDE, DEXTROSE MONOHYDRATE AND SODIUM CHLORIDE 100 ML/HR: 150; 5; 450 INJECTION, SOLUTION INTRAVENOUS at 02:18

## 2018-01-01 RX ADMIN — FAMOTIDINE 20 MG: 10 INJECTION, SOLUTION INTRAVENOUS at 21:31

## 2018-01-01 RX ADMIN — MORPHINE SULFATE 2 MG: 2 INJECTION, SOLUTION INTRAMUSCULAR; INTRAVENOUS at 04:35

## 2018-01-01 RX ADMIN — FAMOTIDINE 20 MG: 10 INJECTION, SOLUTION INTRAVENOUS at 21:41

## 2018-01-01 RX ADMIN — LEVOTHYROXINE SODIUM 25 MCG: 25 TABLET ORAL at 05:49

## 2018-01-01 RX ADMIN — MORPHINE SULFATE 4 MG: 4 INJECTION, SOLUTION INTRAMUSCULAR; INTRAVENOUS at 21:22

## 2018-01-01 RX ADMIN — MORPHINE SULFATE 4 MG: 4 INJECTION, SOLUTION INTRAMUSCULAR; INTRAVENOUS at 00:40

## 2018-01-01 RX ADMIN — ENOXAPARIN SODIUM 90 MG: 100 INJECTION SUBCUTANEOUS at 01:23

## 2018-01-01 RX ADMIN — MORPHINE SULFATE 2 MG: 2 INJECTION, SOLUTION INTRAMUSCULAR; INTRAVENOUS at 13:02

## 2018-01-01 RX ADMIN — LABETALOL HYDROCHLORIDE 10 MG: 5 INJECTION, SOLUTION INTRAVENOUS at 19:35

## 2018-01-01 RX ADMIN — LORAZEPAM 0.5 MG: 2 INJECTION INTRAMUSCULAR; INTRAVENOUS at 13:02

## 2018-01-01 RX ADMIN — LORAZEPAM 0.5 MG: 2 INJECTION INTRAMUSCULAR; INTRAVENOUS at 00:22

## 2018-01-01 RX ADMIN — ASPIRIN 300 MG: 300 SUPPOSITORY RECTAL at 08:47

## 2018-01-01 RX ADMIN — MORPHINE SULFATE 2 MG: 2 INJECTION, SOLUTION INTRAMUSCULAR; INTRAVENOUS at 09:00

## 2018-01-01 RX ADMIN — FAMOTIDINE 20 MG: 10 INJECTION, SOLUTION INTRAVENOUS at 23:16

## 2018-01-01 RX ADMIN — LORAZEPAM 0.5 MG: 2 INJECTION INTRAMUSCULAR; INTRAVENOUS at 21:25

## 2018-01-01 RX ADMIN — CARVEDILOL 6.25 MG: 6.25 TABLET, FILM COATED ORAL at 17:49

## 2018-01-01 RX ADMIN — FAMOTIDINE 20 MG: 10 INJECTION, SOLUTION INTRAVENOUS at 10:26

## 2018-01-01 RX ADMIN — LISINOPRIL 20 MG: 20 TABLET ORAL at 08:55

## 2018-01-01 RX ADMIN — LEVOTHYROXINE SODIUM 25 MCG: 25 TABLET ORAL at 05:48

## 2018-01-01 RX ADMIN — CEFTRIAXONE SODIUM 1 G: 1 INJECTION, SOLUTION INTRAVENOUS at 18:03

## 2018-01-01 RX ADMIN — MORPHINE SULFATE 2 MG: 2 INJECTION, SOLUTION INTRAMUSCULAR; INTRAVENOUS at 08:14

## 2018-01-01 RX ADMIN — MORPHINE SULFATE 2 MG: 2 INJECTION, SOLUTION INTRAMUSCULAR; INTRAVENOUS at 15:32

## 2018-01-01 RX ADMIN — ASPIRIN 81 MG: 81 TABLET ORAL at 08:55

## 2018-01-01 RX ADMIN — MEMANTINE HYDROCHLORIDE 10 MG: 10 TABLET, FILM COATED ORAL at 20:21

## 2018-01-01 RX ADMIN — LORAZEPAM 0.5 MG: 2 INJECTION INTRAMUSCULAR; INTRAVENOUS at 08:22

## 2018-01-01 RX ADMIN — MORPHINE SULFATE 2 MG: 2 INJECTION, SOLUTION INTRAMUSCULAR; INTRAVENOUS at 06:35

## 2018-01-01 RX ADMIN — POTASSIUM CHLORIDE, DEXTROSE MONOHYDRATE AND SODIUM CHLORIDE 75 ML/HR: 150; 5; 450 INJECTION, SOLUTION INTRAVENOUS at 13:38

## 2018-01-01 RX ADMIN — LORAZEPAM 0.5 MG: 2 INJECTION INTRAMUSCULAR; INTRAVENOUS at 04:26

## 2018-01-01 RX ADMIN — MORPHINE SULFATE 4 MG: 4 INJECTION, SOLUTION INTRAMUSCULAR; INTRAVENOUS at 21:25

## 2018-01-01 RX ADMIN — LORAZEPAM 0.5 MG: 2 INJECTION INTRAMUSCULAR; INTRAVENOUS at 18:15

## 2018-01-01 RX ADMIN — GLYCOPYRROLATE 0.2 MG: 0.2 INJECTION, SOLUTION INTRAMUSCULAR; INTRAVENOUS at 16:01

## 2018-01-01 RX ADMIN — ATORVASTATIN CALCIUM 10 MG: 10 TABLET, FILM COATED ORAL at 08:55

## 2018-01-01 RX ADMIN — CARVEDILOL 25 MG: 25 TABLET, FILM COATED ORAL at 20:54

## 2018-01-01 RX ADMIN — MEMANTINE HYDROCHLORIDE 10 MG: 10 TABLET, FILM COATED ORAL at 20:23

## 2018-01-01 RX ADMIN — CEFTRIAXONE SODIUM 1 G: 1 INJECTION, SOLUTION INTRAVENOUS at 15:49

## 2018-01-01 RX ADMIN — CARVEDILOL 25 MG: 25 TABLET, FILM COATED ORAL at 08:56

## 2018-01-01 RX ADMIN — LORAZEPAM 0.5 MG: 2 INJECTION INTRAMUSCULAR; INTRAVENOUS at 17:17

## 2018-01-01 RX ADMIN — FAMOTIDINE 20 MG: 10 INJECTION, SOLUTION INTRAVENOUS at 08:03

## 2018-01-01 RX ADMIN — POTASSIUM CHLORIDE AND SODIUM CHLORIDE 75 ML/HR: 900; 150 INJECTION, SOLUTION INTRAVENOUS at 18:10

## 2018-01-01 RX ADMIN — ASPIRIN 81 MG: 81 TABLET ORAL at 09:00

## 2018-01-01 RX ADMIN — VITAMIN D, TAB 1000IU (100/BT) 1000 UNITS: 25 TAB at 08:38

## 2018-01-01 RX ADMIN — CEFTRIAXONE SODIUM 1 G: 1 INJECTION, SOLUTION INTRAVENOUS at 19:37

## 2018-01-01 RX ADMIN — LISINOPRIL 20 MG: 20 TABLET ORAL at 10:39

## 2018-01-01 RX ADMIN — CARVEDILOL 25 MG: 25 TABLET, FILM COATED ORAL at 17:36

## 2018-01-01 RX ADMIN — FAMOTIDINE 20 MG: 10 INJECTION, SOLUTION INTRAVENOUS at 02:18

## 2018-01-01 RX ADMIN — ATORVASTATIN CALCIUM 10 MG: 10 TABLET, FILM COATED ORAL at 08:08

## 2018-01-01 RX ADMIN — CARVEDILOL 25 MG: 25 TABLET, FILM COATED ORAL at 08:59

## 2018-01-01 RX ADMIN — LORAZEPAM 0.5 MG: 2 INJECTION INTRAMUSCULAR; INTRAVENOUS at 04:36

## 2018-01-01 RX ADMIN — DONEPEZIL HYDROCHLORIDE 5 MG: 5 TABLET, FILM COATED ORAL at 20:23

## 2018-01-01 RX ADMIN — MORPHINE SULFATE 2 MG: 4 INJECTION, SOLUTION INTRAMUSCULAR; INTRAVENOUS at 18:15

## 2018-01-01 RX ADMIN — ATORVASTATIN CALCIUM 10 MG: 10 TABLET, FILM COATED ORAL at 09:00

## 2018-01-01 RX ADMIN — ASPIRIN 81 MG: 81 TABLET ORAL at 10:39

## 2018-01-01 RX ADMIN — LISINOPRIL 10 MG: 10 TABLET ORAL at 08:17

## 2018-01-01 RX ADMIN — Medication 10 MG: at 15:32

## 2018-01-01 RX ADMIN — ASPIRIN 300 MG: 300 SUPPOSITORY RECTAL at 08:03

## 2018-01-01 RX ADMIN — CEFTRIAXONE SODIUM 1 G: 1 INJECTION, SOLUTION INTRAVENOUS at 18:00

## 2018-01-01 RX ADMIN — CARVEDILOL 6.25 MG: 6.25 TABLET, FILM COATED ORAL at 08:38

## 2018-01-01 RX ADMIN — CARVEDILOL 12.5 MG: 12.5 TABLET, FILM COATED ORAL at 08:08

## 2018-01-01 RX ADMIN — VITAMIN D, TAB 1000IU (100/BT) 1000 UNITS: 25 TAB at 09:00

## 2018-01-01 RX ADMIN — MEMANTINE HYDROCHLORIDE 10 MG: 10 TABLET, FILM COATED ORAL at 20:26

## 2018-01-01 RX ADMIN — LORAZEPAM 0.5 MG: 2 INJECTION INTRAMUSCULAR; INTRAVENOUS at 17:18

## 2018-01-01 RX ADMIN — LISINOPRIL 20 MG: 20 TABLET ORAL at 08:59

## 2018-01-01 RX ADMIN — GLYCOPYRROLATE 0.4 MG: 0.2 INJECTION INTRAMUSCULAR; INTRAVENOUS at 21:22

## 2018-01-01 RX ADMIN — MORPHINE SULFATE 4 MG: 4 INJECTION, SOLUTION INTRAMUSCULAR; INTRAVENOUS at 04:25

## 2018-01-01 RX ADMIN — MEMANTINE HYDROCHLORIDE 10 MG: 10 TABLET, FILM COATED ORAL at 10:39

## 2018-01-01 RX ADMIN — LORAZEPAM 0.5 MG: 2 INJECTION INTRAMUSCULAR; INTRAVENOUS at 09:41

## 2018-01-01 RX ADMIN — CARVEDILOL 6.25 MG: 6.25 TABLET, FILM COATED ORAL at 11:49

## 2018-01-01 RX ADMIN — LISINOPRIL 10 MG: 10 TABLET ORAL at 08:38

## 2018-01-01 RX ADMIN — VITAMIN D, TAB 1000IU (100/BT) 1000 UNITS: 25 TAB at 10:41

## 2018-01-01 RX ADMIN — SODIUM CHLORIDE 2 G: 900 INJECTION INTRAVENOUS at 19:39

## 2018-01-01 RX ADMIN — MEMANTINE HYDROCHLORIDE 10 MG: 10 TABLET, FILM COATED ORAL at 09:00

## 2018-01-01 RX ADMIN — LORAZEPAM 0.5 MG: 2 INJECTION INTRAMUSCULAR; INTRAVENOUS at 13:04

## 2018-01-01 RX ADMIN — SODIUM CHLORIDE 75 ML/HR: 9 INJECTION, SOLUTION INTRAVENOUS at 02:04

## 2018-01-01 RX ADMIN — MORPHINE SULFATE 4 MG: 4 INJECTION, SOLUTION INTRAMUSCULAR; INTRAVENOUS at 17:18

## 2018-01-01 RX ADMIN — LORAZEPAM 0.5 MG: 2 INJECTION INTRAMUSCULAR; INTRAVENOUS at 09:00

## 2018-01-01 RX ADMIN — MORPHINE SULFATE 2 MG: 2 INJECTION, SOLUTION INTRAMUSCULAR; INTRAVENOUS at 04:25

## 2018-01-01 RX ADMIN — MORPHINE SULFATE 2 MG: 2 INJECTION, SOLUTION INTRAMUSCULAR; INTRAVENOUS at 20:45

## 2018-01-01 RX ADMIN — ATORVASTATIN CALCIUM 10 MG: 10 TABLET, FILM COATED ORAL at 08:38

## 2018-01-01 RX ADMIN — MORPHINE SULFATE 2 MG: 2 INJECTION, SOLUTION INTRAMUSCULAR; INTRAVENOUS at 01:43

## 2018-01-01 RX ADMIN — DONEPEZIL HYDROCHLORIDE 5 MG: 5 TABLET, FILM COATED ORAL at 20:44

## 2018-01-01 RX ADMIN — MEMANTINE HYDROCHLORIDE 10 MG: 10 TABLET, FILM COATED ORAL at 08:08

## 2018-01-01 RX ADMIN — VITAMIN D, TAB 1000IU (100/BT) 1000 UNITS: 25 TAB at 08:55

## 2018-01-01 RX ADMIN — AMLODIPINE BESYLATE 5 MG: 5 TABLET ORAL at 08:55

## 2018-01-01 RX ADMIN — MORPHINE SULFATE 2 MG: 2 INJECTION, SOLUTION INTRAMUSCULAR; INTRAVENOUS at 04:22

## 2018-01-01 RX ADMIN — LORAZEPAM 0.5 MG: 2 INJECTION INTRAMUSCULAR; INTRAVENOUS at 08:14

## 2018-01-01 RX ADMIN — MEMANTINE HYDROCHLORIDE 10 MG: 10 TABLET, FILM COATED ORAL at 20:19

## 2018-01-01 RX ADMIN — SODIUM CHLORIDE 100 ML/HR: 9 INJECTION, SOLUTION INTRAVENOUS at 00:24

## 2018-01-01 RX ADMIN — CARVEDILOL 6.25 MG: 6.25 TABLET, FILM COATED ORAL at 08:17

## 2018-01-01 RX ADMIN — ASPIRIN 300 MG: 300 SUPPOSITORY RECTAL at 22:28

## 2018-01-01 RX ADMIN — LEVOTHYROXINE SODIUM 25 MCG: 25 TABLET ORAL at 05:44

## 2018-01-01 RX ADMIN — DONEPEZIL HYDROCHLORIDE 5 MG: 5 TABLET, FILM COATED ORAL at 20:19

## 2018-01-01 RX ADMIN — LEVOTHYROXINE SODIUM 25 MCG: 25 TABLET ORAL at 06:44

## 2018-01-01 RX ADMIN — LEVOTHYROXINE SODIUM 25 MCG: 25 TABLET ORAL at 06:29

## 2018-01-01 RX ADMIN — LORAZEPAM 0.5 MG: 2 INJECTION INTRAMUSCULAR; INTRAVENOUS at 20:40

## 2018-01-01 RX ADMIN — MORPHINE SULFATE 2 MG: 2 INJECTION, SOLUTION INTRAMUSCULAR; INTRAVENOUS at 23:12

## 2018-01-01 RX ADMIN — MORPHINE SULFATE 2 MG: 2 INJECTION, SOLUTION INTRAMUSCULAR; INTRAVENOUS at 12:28

## 2018-01-01 RX ADMIN — MEMANTINE HYDROCHLORIDE 10 MG: 10 TABLET, FILM COATED ORAL at 20:44

## 2018-01-01 RX ADMIN — CARVEDILOL 25 MG: 25 TABLET, FILM COATED ORAL at 18:00

## 2018-01-01 RX ADMIN — MORPHINE SULFATE 2 MG: 2 INJECTION, SOLUTION INTRAMUSCULAR; INTRAVENOUS at 11:27

## 2018-01-01 RX ADMIN — ATORVASTATIN CALCIUM 40 MG: 80 TABLET, FILM COATED ORAL at 21:41

## 2018-01-01 RX ADMIN — ATORVASTATIN CALCIUM 10 MG: 10 TABLET, FILM COATED ORAL at 10:40

## 2018-01-01 RX ADMIN — MORPHINE SULFATE 4 MG: 4 INJECTION, SOLUTION INTRAMUSCULAR; INTRAVENOUS at 13:04

## 2018-01-01 RX ADMIN — ASPIRIN 81 MG: 81 TABLET ORAL at 08:08

## 2018-01-01 RX ADMIN — CARVEDILOL 25 MG: 25 TABLET, FILM COATED ORAL at 18:44

## 2018-04-10 PROBLEM — F03.90 DEMENTIA (HCC): Status: ACTIVE | Noted: 2018-01-01

## 2018-04-10 PROBLEM — G93.40 ENCEPHALOPATHY: Status: ACTIVE | Noted: 2018-01-01

## 2018-04-10 PROBLEM — N39.0 UTI (URINARY TRACT INFECTION) WITH PYURIA: Status: ACTIVE | Noted: 2018-01-01

## 2018-04-10 NOTE — ED PROVIDER NOTES
EMERGENCY DEPARTMENT ENCOUNTER    CHIEF COMPLAINT  Chief Complaint: Confusion  History given by: patient, NH  History limited by: confusion  Room Number: 35/35  PMD: Clara Ann Pallares, MD      HPI:  Pt is a 89 y.o. female who presents from the NH complaining of increased confusion. Patient notes HA. Patient is unsure of why she is here.     Duration:  PTA  Onset: gradual  Timing: constant  Location: Neuro  Radiation: none  Quality: confusion  Intensity/Severity: moderate  Progression: unchanged  Associated Symptoms: none  Aggravating Factors: not stated  Alleviating Factors: not stated  Previous Episodes: none  Treatment before arrival: not stated    PAST MEDICAL HISTORY  Active Ambulatory Problems     Diagnosis Date Noted   • No Active Ambulatory Problems     Resolved Ambulatory Problems     Diagnosis Date Noted   • No Resolved Ambulatory Problems     Past Medical History:   Diagnosis Date   • Dementia    • Hyperlipidemia    • Hypertension        PAST SURGICAL HISTORY  History reviewed. No pertinent surgical history.    FAMILY HISTORY  History reviewed. No pertinent family history.    SOCIAL HISTORY  Social History     Social History   • Marital status:      Spouse name: N/A   • Number of children: N/A   • Years of education: N/A     Occupational History   • Not on file.     Social History Main Topics   • Smoking status: Never Smoker   • Smokeless tobacco: Not on file   • Alcohol use Yes      Comment: occasional   • Drug use: No   • Sexual activity: Defer     Other Topics Concern   • Not on file     Social History Narrative   • No narrative on file       ALLERGIES  Review of patient's allergies indicates no known allergies.    REVIEW OF SYSTEMS  Review of Systems   Unable to perform ROS: Mental status change       PHYSICAL EXAM  ED Triage Vitals   Temp Heart Rate Resp BP SpO2   04/10/18 1754 04/10/18 1751 04/10/18 1751 04/10/18 1751 04/10/18 1751   97.9 °F (36.6 °C) (!) 125 18 (!) 181/111 96 %      Temp  src Heart Rate Source Patient Position BP Location FiO2 (%)   04/10/18 1754 -- -- -- --   Oral           Physical Exam   Constitutional: She is oriented to person, place, and time and well-developed, well-nourished, and in no distress. No distress.   HENT:   Head: Normocephalic and atraumatic.   Eyes: EOM are normal.   Neck: Normal range of motion. Neck supple.   Cardiovascular: Normal rate and regular rhythm.    Pulmonary/Chest: Effort normal and breath sounds normal. No respiratory distress.   Abdominal: Soft. There is no tenderness. There is no rebound and no guarding.   Musculoskeletal:   CARIAS well   Neurological: She is alert and oriented to person, place, and time. She has normal sensation and normal strength.   Patient is lethargic and has slurred speech. Responds to verbal stimuli. Does not follow commands. Neuro exam limited secondary to patient's condition.   Skin: Skin is warm and dry. No rash noted.   Psychiatric: Mood and affect normal.   Nursing note and vitals reviewed.      LAB RESULTS  Lab Results (last 24 hours)     Procedure Component Value Units Date/Time    CBC & Differential [[id number]] Collected:  04/10/18 1823    Specimen:  Blood Updated:  04/10/18 1910    Narrative:       The following orders were created for panel order CBC & Differential.  Procedure                               Abnormality         Status                     ---------                               -----------         ------                     CBC Auto Differential[[id number]]        Abnormal            Final result                 Please view results for these tests on the individual orders.    Comprehensive Metabolic Panel [[id number]] Collected:  04/10/18 1823    Specimen:  Blood Updated:  04/10/18 1908     Glucose 97 mg/dL      BUN 18 mg/dL      Creatinine 0.82 mg/dL      Sodium 145 mmol/L      Potassium 4.1 mmol/L      Chloride 107 mmol/L      CO2 25.6 mmol/L      Calcium 8.9 mg/dL      Total Protein 7.0 g/dL       "Albumin 3.90 g/dL      ALT (SGPT) 15 U/L      AST (SGOT) 18 U/L      Alkaline Phosphatase 65 U/L      Total Bilirubin 0.9 mg/dL      eGFR Non African Amer 66 mL/min/1.73      Globulin 3.1 gm/dL      A/G Ratio 1.3 g/dL      BUN/Creatinine Ratio 22.0     Anion Gap 12.4 mmol/L     Narrative:       The MDRD GFR formula is only valid for adults with stable renal function between ages 18 and 70.    Protime-INR [[id number]]  (Abnormal) Collected:  04/10/18 1823    Specimen:  Blood Updated:  04/10/18 1906     Protime 14.2 Seconds      INR 1.12 (H)    Troponin [[id number]]  (Normal) Collected:  04/10/18 1823    Specimen:  Blood Updated:  04/10/18 1913     Troponin T <0.010 ng/mL     Narrative:       Troponin T Reference Ranges:  Less than 0.03 ng/mL:    Negative for AMI  0.03 to 0.09 ng/mL:      Indeterminant for AMI  Greater than 0.09 ng/mL: Positive for AMI    Lactic Acid, Plasma [[id number]]  (Normal) Collected:  04/10/18 1823    Specimen:  Blood Updated:  04/10/18 1909     Lactate 1.4 mmol/L     Procalcitonin [[id number]]  (Abnormal) Collected:  04/10/18 1823    Specimen:  Blood Updated:  04/10/18 1913     Procalcitonin 0.04 (L) ng/mL     Narrative:       As a Marker for Sepsis (Non-Neonates):   1. <0.5 ng/mL represents a low risk of severe sepsis and/or septic shock.  1. >2 ng/mL represents a high risk of severe sepsis and/or septic shock.    As a Marker for Lower Respiratory Tract Infections that require antibiotic therapy:  PCT on Admission     Antibiotic Therapy             6-12 Hrs later  > 0.5                Strongly Recommended            >0.25 - <0.5         Recommended  0.1 - 0.25           Discouraged                   Remeasure/reassess PCT  <0.1                 Strongly Discouraged          Remeasure/reassess PCT      As 28 day mortality risk marker: \"Change in Procalcitonin Result\" (> 80 % or <=80 %) if Day 0 (or Day 1) and Day 4 values are available. Refer to http://www.brahms-pct-calculator.com/   Change in " PCT <=80 %   A decrease of PCT levels below or equal to 80 % defines a positive change in PCT test result representing a higher risk for 28-day all-cause mortality of patients diagnosed with severe sepsis or septic shock.  Change in PCT > 80 %   A decrease of PCT levels of more than 80 % defines a negative change in PCT result representing a lower risk for 28-day all-cause mortality of patients diagnosed with severe sepsis or septic shock.                Magnesium [[id number]]  (Normal) Collected:  04/10/18 1823    Specimen:  Blood Updated:  04/10/18 1908     Magnesium 2.2 mg/dL     TSH [[id number]]  (Normal) Collected:  04/10/18 1823    Specimen:  Blood Updated:  04/10/18 1913     TSH 3.180 mIU/mL     CBC Auto Differential [[id number]]  (Abnormal) Collected:  04/10/18 1823    Specimen:  Blood Updated:  04/10/18 1910     WBC 7.15 10*3/mm3      RBC 4.58 10*6/mm3      Hemoglobin 13.7 g/dL      Hematocrit 43.9 %      MCV 95.9 fL      MCH 29.9 pg      MCHC 31.2 (L) g/dL      RDW 14.5 (H) %      RDW-SD 50.4 fl      MPV 11.7 fL      Platelets 164 10*3/mm3      Neutrophil % 58.4 %      Lymphocyte % 28.8 %      Monocyte % 8.5 %      Eosinophil % 3.4 %      Basophil % 0.6 %      Immature Grans % 0.3 %      Neutrophils, Absolute 4.18 10*3/mm3      Lymphocytes, Absolute 2.06 10*3/mm3      Monocytes, Absolute 0.61 10*3/mm3      Eosinophils, Absolute 0.24 10*3/mm3      Basophils, Absolute 0.04 10*3/mm3      Immature Grans, Absolute 0.02 10*3/mm3     Urinalysis With / Microscopic If Indicated - Urine, Catheter [[id number]]  (Abnormal) Collected:  04/10/18 1825    Specimen:  Urine from Urine, Catheter Updated:  04/10/18 1911     Color, UA Yellow     Appearance, UA Turbid (A)     pH, UA 5.5     Specific Gravity, UA 1.016     Glucose, UA Negative     Ketones, UA Negative     Bilirubin, UA Negative     Blood, UA Moderate (2+) (A)     Protein, UA Trace (A)     Leuk Esterase, UA Large (3+) (A)     Nitrite, UA Positive (A)     Urobilinogen,  UA 1.0 E.U./dL    Urinalysis, Microscopic Only - Urine, Clean Catch [[id number]]  (Abnormal) Collected:  04/10/18 1825    Specimen:  Urine from Urine, Catheter Updated:  04/10/18 1911     RBC, UA  /HPF      Unable to determine due to loaded field (A)     WBC, UA Too Numerous to Count (A) /HPF      Bacteria, UA  /HPF      Unable to determine due to loaded field (A)     Squamous Epithelial Cells, UA  /HPF      Unable to determine due to loaded field (A)     Hyaline Casts, UA  /LPF      Unable to determine due to loaded field     Methodology Manual Light Microscopy          I ordered the above labs and reviewed the results    RADIOLOGY  XR Chest 2 View   Final Result   No focal pulmonary consolidation. Cardiomegaly. Ectatic   appearing aorta. Follow-up as clinically indicated.       This report was finalized on 4/10/2018 6:49 PM by Dr. Juan Luis Baltazar MD.          CT Head Without Contrast   Final Result           No acute intracranial hemorrhage or hydrocephalus. If there is   further clinical concern, MRI could be considered for further   evaluation.       This report was finalized on 4/10/2018 6:47 PM by Dr. Juan Luis Baltazar MD.               I ordered the above noted radiological studies. Interpreted by radiologist. Reviewed by me in PACS.       PROCEDURES  Procedures  Unable to perform NIHSS secondary to patient cooperativeness and condition.     EKG           EKG time: 1859  Rhythm/Rate: 117, afib  prolonged QT interval  QRS, axis: LAD    Interpreted Contemporaneously by me, independently viewed  Afib and prolonged QT interval is new compared to prior 10/24/16      PROGRESS AND CONSULTS  ED Course     1819: labs and radiological studies ordered.     1945: Discussed case with Dr. Joy, [facility] hospitalist concerning the patient and the findings in the ED. Dr. Garcia requests admittance to med-surg.       MEDICAL DECISION MAKING  Results were reviewed/discussed with the patient and they were also made aware  of online access. Pt also made aware that some labs, such as cultures, will not be resulted during ER visit and follow up with PMD is necessary.     MDM  Number of Diagnoses or Management Options  Toxic encephalopathy:   UTI (urinary tract infection) with pyuria:      Amount and/or Complexity of Data Reviewed  Clinical lab tests: reviewed and ordered (UA UTI, TSH 3.80, Procalcitonin 0.04, Troponin <0.010, Lactic acid 1.4, Magnesium 2.2, WBC 7.15. BUN 18, creatinine 0.82)  Tests in the radiology section of CPT®: reviewed and ordered (CHest Xray: No consolidation. Head CT: No ICH)  Tests in the medicine section of CPT®: reviewed and ordered (EKG Documented in procedure note)  Discuss the patient with other providers: yes ([facility] hospitalist, Dr. Joy)    Patient Progress  Patient progress: stable         DIAGNOSIS  Final diagnoses:   UTI (urinary tract infection) with pyuria   Toxic encephalopathy       DISPOSITION  ADMISSION    Discussed treatment plan and reason for admission with pt/family and admitting physician.  Pt/family voiced understanding of the plan for admission for further testing/treatment as needed.       Latest Documented Vital Signs:  As of 7:45 PM  BP- (!) 173/146 HR- (!) 137 Temp- 97.9 °F (36.6 °C) (Oral) O2 sat- 95%    --  Documentation assistance provided by rajiv Ballard for Domenic Menezes MD.  Information recorded by the scribe was done at my direction and has been verified and validated by me.       Juliet Ballard  04/10/18 1945       Domenic Menezes MD  04/10/18 7582

## 2018-04-10 NOTE — ED NOTES
Patient baseline is confused, patient can tell us her name but does not know where she is or can tell us why she is here. Patient just states that she is sleepy and states that the blood pressure cuff is hurting her.      Oralia Nielsen RN  04/10/18 7701

## 2018-04-10 NOTE — ED NOTES
Report received from RIKCY Barton. VS rechecked, meds collected for administration. Pt resting with eyes closed, opens eyes to voice. Speech unclear at times.     Lindsay Rios RN  04/10/18 1941

## 2018-04-10 NOTE — ED NOTES
LSN noon today, hx dementia. Having intermittent right arm weakness, slurred speech. EMS states she was complaining of headache.      Candis Asher RN  04/10/18 1750       Candis Asher RN  04/10/18 0982

## 2018-04-10 NOTE — ED NOTES
Patient unable to preform NIH Stoke Scale due to mental state. Patient sleeping and lethargic, patient was able to grasp my hands and had equal strength on arms and lifted both legs and talked to me but would not do other skills. Patient refused saying she was sleepy.      Oralia Nielsen RN  04/10/18 3161

## 2018-04-11 PROBLEM — I48.91 NEW ONSET A-FIB (HCC): Status: ACTIVE | Noted: 2018-01-01

## 2018-04-11 NOTE — H&P
"    Patient Identification:  Name: Shira Sosa  Age/Sex: 89 y.o. female  :  1928  MRN: [id number]         Primary Care Physician: Clara Ann Pallares, MD    Chief Complaint   Patient presents with   • Altered Mental Status   • Speech Problem       Subjective   Patient is a 89 y.o. female with a h/o HTN, hypothyroidism, Dementia who was sent in from nursing facility for increasing confusion. Patient is confused and has underlying dementia so cannot give me any history so it is obtained from chart review and speaking with ED staff. She states \"I dont know\" to every question I ask her. Not sure how long she has been confused or if she has had any associated symptoms. Unsure what her baseline mental status is. Was found to have infectious appearing u/a in ED.     History of Present Illness    Past Medical History:   Diagnosis Date   • Dementia    • Hyperlipidemia    • Hypertension      History reviewed. No pertinent surgical history.  History reviewed. No pertinent family history.  Social History   Substance Use Topics   • Smoking status: Never Smoker   • Smokeless tobacco: Not on file   • Alcohol use Yes      Comment: occasional       (Not in a hospital admission)  Allergies:  Review of patient's allergies indicates no known allergies.    Review of Systems   Unable to obtain as she is altered    Objective    Vital Signs  Temp:  [97.9 °F (36.6 °C)] 97.9 °F (36.6 °C)  Heart Rate:  [] 99  Resp:  [18] 18  BP: (143-185)/(111-146) 160/124  Body mass index is 31.42 kg/m².    Physical Exam   Constitutional:   Chronically ill-appearing   HENT:   Head: Normocephalic and atraumatic.   Mouth/Throat: No oropharyngeal exudate.   Eyes: Conjunctivae are normal. No scleral icterus.   Neck: Normal range of motion. No JVD present. No tracheal deviation present.   Cardiovascular:   No murmur heard.  Irregularly irregular, tachy   Pulmonary/Chest: Effort normal and breath sounds normal.   Abdominal: Soft. Bowel sounds " are normal. She exhibits no distension. There is no tenderness.   Musculoskeletal: She exhibits no edema or deformity.   Neurological: She is alert.   Oriented x1   Skin: Skin is warm and dry.   Psychiatric:   Confused, tangential thinking   Nursing note and vitals reviewed.      Results Review:   I reviewed the patient's new clinical results.  Imaging Results (last 24 hours)     Procedure Component Value Units Date/Time    XR Chest 2 View [[id number]] Collected:  04/10/18 1847     Updated:  04/10/18 1852    Narrative:       XR CHEST 2 VW-     HISTORY: Female who is 89 years-old,  altered mental status     TECHNIQUE: Frontal and lateral views of the chest     COMPARISON: 9/28/2017     FINDINGS: Heart is enlarged. Aorta appears ectatic. Pulmonary  vasculature is unremarkable. No focal pulmonary consolidation, pleural  effusion, or pneumothorax is seen, apices are slightly obscured by the  chin, lateral view is limited by arm down position. No acute osseous  process.       Impression:       No focal pulmonary consolidation. Cardiomegaly. Ectatic  appearing aorta. Follow-up as clinically indicated.     This report was finalized on 4/10/2018 6:49 PM by Dr. Juan Luis Baltazar MD.       CT Head Without Contrast [[id number]] Collected:  04/10/18 1846     Updated:  04/10/18 1850    Narrative:       CT HEAD WO CONTRAST-     INDICATIONS: Confusion     TECHNIQUE: Radiation dose reduction techniques were utilized, including  automated exposure control and exposure modulation based on body size.      Noncontrast head CT     COMPARISON: 2/1/2015     FINDINGS:              No acute intracranial hemorrhage, midline shift or mass effect. No acute  territorial infarct is identified.     Moderate similar-appearing periventricular hypodensities suggest chronic  small vessel ischemic change in a patient this age.     Arterial calcifications are seen at the base of the brain.     Ventricles, cisterns, cerebral sulci are unremarkable for  patient age.     The visualized paranasal sinuses, orbits, mastoid air cells are  unremarkable.                   Impression:              No acute intracranial hemorrhage or hydrocephalus. If there is  further clinical concern, MRI could be considered for further  evaluation.     This report was finalized on 4/10/2018 6:47 PM by Dr. Juan Luis Baltazar MD.             Assessment/Plan     Principal Problem:    UTI (urinary tract infection) with pyuria  Active Problems:    Dementia    Encephalopathy      Assessment & Plan  1. UTI  - u/a looks infectious  - cont rocephin  - gentle IVF  - send UCx as not sent in ED, f/u BCx    2. Metabolic Encephalopathy on Dementia  - unclear baseline but likely 2/2 #1  - monitor for improvement on abx    3. Possible AFib  - seen on cardiac monitor in ED, looked irregular  - getting ekg  - no history of AFib that I can see  - rate anywhere from 80s-120s    4. HTN  - restarted home meds    **EKG confirms AFib, will give one dose therapeutic lovenox. Increase coreg to 6.25mg and get Cardiology to see her. Will check TSH and Echo as well      I discussed the patients findings and my recommendations with patient and nursing staff.          Kwadwo Zhao MD  [facility]  04/10/18  9:26 PM

## 2018-04-11 NOTE — PLAN OF CARE
Problem: Patient Care Overview  Goal: Plan of Care Review  Outcome: Ongoing (interventions implemented as appropriate)   04/11/18 0408   Coping/Psychosocial   Plan of Care Reviewed With patient   Plan of Care Review   Progress no change   OTHER   Outcome Summary Pt admitted to unit this evening. Pt alert and oriented to self only. Pt assist x2, RA, Afib and NPO. Consulted Cardiology. Pt has NS @100ml/hr and IV ABX. VSS. Will continue to monitor.       Problem: Skin Injury Risk (Adult)  Goal: Identify Related Risk Factors and Signs and Symptoms  Outcome: Outcome(s) achieved Date Met: 04/11/18    Goal: Skin Health and Integrity  Outcome: Ongoing (interventions implemented as appropriate)      Problem: Fall Risk (Adult)  Goal: Identify Related Risk Factors and Signs and Symptoms  Outcome: Outcome(s) achieved Date Met: 04/11/18    Goal: Absence of Fall  Outcome: Ongoing (interventions implemented as appropriate)

## 2018-04-11 NOTE — PROGRESS NOTES
Discharge Planning Assessment  [facility]     Patient Name: Shira Sosa  MRN: [id number]  Today's Date: 4/11/2018    Admit Date: 4/10/2018          Discharge Needs Assessment     Row Name 04/11/18 1439       Living Environment    Lives With facility resident    Name(s) of Who Lives With Patient Yanni     Current Living Arrangements independent/assisted living facility    Duration at Residence 3 years     Potentially Unsafe Housing Conditions --   No concerns     Primary Care Provided by child(antonia)    Provides Primary Care For no one    Family Caregiver if Needed child(antonia), adult    Quality of Family Relationships helpful;involved;supportive    Able to Return to Prior Arrangements yes       Resource/Environmental Concerns    Resource/Environmental Concerns none    Transportation Concerns car, none       Transition Planning    Patient/Family Anticipates Transition to inpatient rehabilitation facility    Patient/Family Anticipated Services at Transition none       Discharge Needs Assessment    Readmission Within the Last 30 Days no previous admission in last 30 days    Concerns to be Addressed no discharge needs identified;denies needs/concerns at this time    Equipment Currently Used at Home walker, standard    Anticipated Changes Related to Illness none    Equipment Needed After Discharge none            Discharge Plan     Row Name 04/11/18 9958       Plan    Plan Return to [facility] vs. SNF ([facility] 1st choice)     Patient/Family in Agreement with Plan yes    Plan Comments CCP met with patient at bedside. CCP role explained and discharge planning discussed. Patient was alert but disoriented to time, place and situation. CCP made outbound call to Bart Duenas (son [phone]). Per son, patient has been a resident at [facility] for 3 years. Patient is mobile with her walker and has some confusion at times. Per patient's son, she is not at her baseline. Patient is normally incontinent and he has had to increase  her care at [facility]. Patient has been to [facility] in the past for rehab and if rehab is needed, would like patient to go back to [facility]. Patient's PCP is Dr. Pallares. Patient's son would like updates in discharge plan periodically. CCP will follow for PT durga to see if rehab is needed. Anjali Ordaz CSW         Destination     No service coordination in this encounter.      Durable Medical Equipment     No service coordination in this encounter.      Dialysis/Infusion     No service coordination in this encounter.      Home Medical Care     No service coordination in this encounter.      Social Care     No service coordination in this encounter.                Demographic Summary     Row Name 04/11/18 1433       General Information    Admission Type inpatient    Arrived From --   Assisted living     Required Notices Provided Important Message from Medicare    Referral Source admission list    Reason for Consult discharge planning    Preferred Language English     Used During This Interaction no       Contact Information    Permission Granted to Share Info With family/designee            Functional Status     Row Name 04/11/18 1434       Functional Status    Usual Activity Tolerance good    Current Activity Tolerance moderate       Functional Status, IADL    Medications assistive person;assistive equipment    Meal Preparation assistive person;assistive equipment    Housekeeping assistive person;assistive equipment    Laundry assistive equipment;assistive person    Shopping assistive person;assistive equipment       Mental Status    General Appearance WDL ex       Mental Status Summary    Recent Changes in Mental Status/Cognitive Functioning memory (recent);ability to understand            Psychosocial    No documentation.           Abuse/Neglect    No documentation.           Legal    No documentation.           Substance Abuse    No documentation.           Patient Forms    No documentation.          ADRIENNE MuellerW

## 2018-04-11 NOTE — PROGRESS NOTES
LOS: 1 day     Name: Shira Sosa  Age/Sex: 89 y.o. female  :  1928        PCP: Clara Ann Pallares, MD    Subjective   Doing ok but not really answering questions appropriately.      ROS no reliable      aspirin 81 mg Oral Daily   atorvastatin 10 mg Oral Daily   carvedilol 6.25 mg Oral BID With Meals   ceftriaxone 1 g Intravenous Q24H   cholecalciferol 1,000 Units Oral Daily   donepezil 5 mg Oral Nightly   levothyroxine 25 mcg Oral Q AM   lisinopril 10 mg Oral Daily   memantine 10 mg Oral Q12H       sodium chloride 100 mL/hr Last Rate: 100 mL/hr (18 0024)       Objective   Vital Signs  Temp:  [97.6 °F (36.4 °C)-98.5 °F (36.9 °C)] 97.6 °F (36.4 °C)  Heart Rate:  [] 118  Resp:  [18] 18  BP: (121-185)/() 128/86  Body mass index is 31.06 kg/m².  No intake or output data in the 24 hours ending 18 0822    Physical Exam   Constitutional: She appears well-nourished.   HENT:   Head: Normocephalic and atraumatic.   Eyes: EOM are normal. No scleral icterus.   Neck: Neck supple. No JVD present.   Cardiovascular: Normal rate and regular rhythm.    Pulmonary/Chest: Effort normal and breath sounds normal.   Abdominal: Soft. Bowel sounds are normal.   Musculoskeletal: She exhibits no edema.   Skin: Skin is warm and dry.   Nursing note and vitals reviewed.        Results Review:       I reviewed the patient's new clinical results.    Results from last 7 days  Lab Units 04/10/18  1823   WBC 10*3/mm3 7.15   HEMOGLOBIN g/dL 13.7   PLATELETS 10*3/mm3 164     Results from last 7 days  Lab Units 04/10/18  1823   SODIUM mmol/L 145   POTASSIUM mmol/L 4.1   CHLORIDE mmol/L 107   CO2 mmol/L 25.6   BUN mg/dL 18   CREATININE mg/dL 0.82   CALCIUM mg/dL 8.9   MAGNESIUM mg/dL 2.2   Estimated Creatinine Clearance: 51.8 mL/min (by C-G formula based on SCr of 0.82 mg/dL).    Results from last 7 days  Lab Units 04/10/18  1825   NITRITE UA  Positive*   WBC UA /HPF Too Numerous to Count*   BACTERIA UA /HPF  Unable to determine due to loaded field*   SQUAM EPITHEL UA /HPF Unable to determine due to loaded field*   URINECX    >100,000 CFU/mL Gram Negative Bacilli*       2/4/17- HOLTER MONITOR  FINDING(S):   1. The patient wore the monitor for 27 hours and 37 minutes with an   average heart rate of 63 and a range of .   2. Over 2370 PACs.   3. Over 1000 PVCs (6 couplets and 6 triplets).   4. No significant pauses.   5. Review of the rhythm strips reveals sinus rhythm with frequent atrial   and ventricular ectopy including multiple short bursts of narrow complex   tachycardia with a rate of approximately 145 beats per minute, the longest   of which was nearly 20 beats in   duration, but no sustained atrial or ventricular arrhythmias.   6. No diary was submitted for review.     2/4/17- Transthoracic Echo  Conclusions:  Global left ventricular wall motion and contractility are within normal  limits.  The EF 4ch was calculated at 64%.  The left ventricular diastolic filling pattern is consistent with  pseudonormalization.   The left ventricular diastolic filling pattern is consistent with  elevated mean left atrial pressure.  The left atrium is moderately enlarged.  Moderate to severe concentric left ventricular hypertrophy is observed.  There is no pericardial effusion.  There is borderline aortic root dilatation.    Assessment/Plan   Principal Problem:    UTI (urinary tract infection) with pyuria  Active Problems:    Dementia    Encephalopathy      PLAN  - conitnue rocephin for UTI and follow up culture results and sensitivities  - Follow up echo, TSH WNL, records from NH reviewed and listed above  - continue BB       Disposition        Kyle Shoemaker MD  [facility]  04/11/18  8:22 AM

## 2018-04-11 NOTE — CONSULTS
Shira Sosa   89 y.o.  female    LOS: 1 day   Patient Care Team:  Clara Ann Pallares, MD as PCP - General (Internal Medicine)      Subjective     Chief Complaint: confusion    History of Present Illness:  Ms Sosa is an 89 y.o. female with a h/o HTN, hypothyroidism, and dementia who was sent in from nursing facility for increasing confusion. Patient presented to ER confused with underlying dementia and unable to give a history. We have been ask to see for possible at fib, irreg appearing ekg in ER.  She also has been found to have a UTI. She is unable to answer any questions, her only answer is I dont know.          Past Medical History:   Diagnosis Date   • Dementia    • Hyperlipidemia    • Hypertension      History reviewed. No pertinent surgical history.  Prescriptions Prior to Admission   Medication Sig Dispense Refill Last Dose   • aspirin 81 MG tablet Take 81 mg by mouth Daily.      • carvedilol (COREG) 3.125 MG tablet Take 3.125 mg by mouth 2 (Two) Times a Day With Meals.      • Cholecalciferol (VITAMIN D3) 2000 UNITS tablet Take  by mouth.      • donepezil (ARICEPT) 5 MG tablet Take 5 mg by mouth Every Night.      • levothyroxine (SYNTHROID, LEVOTHROID) 25 MCG tablet Take 25 mcg by mouth Daily.      • lisinopril (PRINIVIL,ZESTRIL) 10 MG tablet Take 10 mg by mouth Daily.      • Memantine HCl (NAMENDA XR PO) Take 28 mg by mouth.      • pravastatin (PRAVACHOL) 20 MG tablet Take 20 mg by mouth Daily.      • cephalexin (KEFLEX) 500 MG capsule Take 1 capsule by mouth 3 (Three) Times a Day. 15 capsule 0    • cephalexin (KEFLEX) 500 MG capsule Take 1 capsule by mouth 3 (Three) Times a Day. 21 capsule 0        History reviewed. No pertinent family history.  Social History     Social History   • Marital status:      Spouse name: N/A   • Number of children: N/A   • Years of education: N/A     Occupational History   • Not on file.     Social History Main Topics   • Smoking status: Never Smoker   •  Smokeless tobacco: Never Used   • Alcohol use No      Comment: occasional   • Drug use: No   • Sexual activity: Defer     Other Topics Concern   • Not on file     Social History Narrative   • No narrative on file     Objective       Review of Systems:  Unable to obtain      Current Facility-Administered Medications:   •  aspirin EC tablet 81 mg, 81 mg, Oral, Daily, Kwadwo Zhao MD, 81 mg at 04/11/18 0838  •  atorvastatin (LIPITOR) tablet 10 mg, 10 mg, Oral, Daily, Kwadwo Zhao MD, 10 mg at 04/11/18 0838  •  carvedilol (COREG) tablet 6.25 mg, 6.25 mg, Oral, BID With Meals, Kwadwo Zhao MD, 6.25 mg at 04/11/18 0838  •  cefTRIAXone (ROCEPHIN) IVPB 1 g, 1 g, Intravenous, Q24H, Kwadwo Zhao MD  •  cholecalciferol (VITAMIN D3) tablet 1,000 Units, 1,000 Units, Oral, Daily, Kwadwo Zhao MD, 1,000 Units at 04/11/18 0838  •  donepezil (ARICEPT) tablet 5 mg, 5 mg, Oral, Nightly, Kwadwo Zhao MD  •  levothyroxine (SYNTHROID, LEVOTHROID) tablet 25 mcg, 25 mcg, Oral, Q AM, Kwadwo Zhao MD, 25 mcg at 04/11/18 0544  •  lisinopril (PRINIVIL,ZESTRIL) tablet 10 mg, 10 mg, Oral, Daily, Kwadwo Zhao MD, 10 mg at 04/11/18 0838  •  memantine (NAMENDA) tablet 10 mg, 10 mg, Oral, Q12H, Kwadwo Zhao MD, 10 mg at 04/11/18 0838  •  sodium chloride 0.9 % flush 1-10 mL, 1-10 mL, Intravenous, PRN, Kwadwo Zhao MD  •  Insert peripheral IV, , , Once **AND** sodium chloride 0.9 % flush 10 mL, 10 mL, Intravenous, PRN, Domenic Menezes MD  •  sodium chloride 0.9 % infusion, 100 mL/hr, Intravenous, Continuous, Kwadwo Zhao MD, Last Rate: 100 mL/hr at 04/11/18 0024, 100 mL/hr at 04/11/18 0024      Physical Exam:   Vital Sign Min/Max for last 24 hours  Temp  Min: 97.6 °F (36.4 °C)  Max: 98.5 °F (36.9 °C)   BP  Min: 121/82  Max: 185/118    Pulse  Min: 95  Max: 137     Wt Readings from Last 3 Encounters:   04/10/18 87.3 kg (192 lb 7 oz)   09/28/17  72.6 kg (160 lb)   10/24/16 72.6 kg (160 lb)       General Appearance:   Awake,  Alert, cooperative, elderly wf who is oriented to person only in no acute distress   Head:    Normocephalic, without obvious abnormality, atraumatic   Eyes:            Conjunctivae normal, non icteric, eom intact      Neck:   No adenopathy, supple, trachea midline, no thyromegaly, no   carotid bruit, no JVD, no elevated cvp   Lungs:     Clear to auscultation,respirations regular, even and                  unlabored    Heart:    Irregular rhythm and normal rate, normal S1 and S2,            No murmur, no gallop, no rub, no click    Chest Wall:    No abnormalities observed   Abdomen:     Normal bowel sounds, no masses, soft non-tender, non-distended,                    Rectal:     Deferred   Extremities:   No edema. Moves all extremities well, no cyanosis, no erythema   Pulses:   Pulses palpable and equal bilaterally   Skin:   No bleeding, bruising or rash   Neurologic:   Speech clear and stutters at times, no facial drooping     : voids      MONITOR: at [facility] vr 89    Results Review:     Sodium Sodium   Date Value Ref Range Status   04/10/2018 145 136 - 145 mmol/L Final      Potassium Potassium   Date Value Ref Range Status   04/10/2018 4.1 3.5 - 5.2 mmol/L Final      Chloride Chloride   Date Value Ref Range Status   04/10/2018 107 98 - 107 mmol/L Final      Bicarbonate No results found for: PLASMABICARB   BUN BUN   Date Value Ref Range Status   04/10/2018 18 8 - 23 mg/dL Final      Creatinine Creatinine   Date Value Ref Range Status   04/10/2018 0.82 0.57 - 1.00 mg/dL Final      Calcium Calcium   Date Value Ref Range Status   04/10/2018 8.9 8.6 - 10.5 mg/dL Final      Magnesium Magnesium   Date Value Ref Range Status   04/10/2018 2.2 1.6 - 2.4 mg/dL Final          Results from last 7 days  Lab Units 04/10/18  1823   WBC 10*3/mm3 7.15   HEMOGLOBIN g/dL 13.7   HEMATOCRIT % 43.9   PLATELETS 10*3/mm3 164       Lab Results  Lab Value  Date/Time   TROPONINT <0.010 04/10/2018 1823   TROPONINT <0.010 09/28/2017 0034   TROPONINT <0.010 10/24/2016 1840   TROPONINT <0.01 02/03/2015 0456   TROPONINT <0.01 02/02/2015 0401   TROPONINT <0.01 02/01/2015 1331   TROPONINT <0.01 08/29/2014 1750     No results found for: CHOL  Lab Results   Component Value Date    HDL 50 02/03/2015     Lab Results   Component Value Date     (H) 02/03/2015     Lab Results   Component Value Date    TRIG 115 02/03/2015       Lab Results   Component Value Date    HGBA1C 5.8 (H) 02/03/2015      Lab Results   Component Value Date    TSH 3.180 04/10/2018    TSH 3.150 04/10/2018        Echo 2/1/2015  Left Ventricle:  The left ventricular chamber size is normal. Mild concentric left  ventricular hypertrophy is observed. Global left ventricular wall motion  and contractility are within normal limits. There is normal left  ventricular systolic function. The estimated ejection fraction is  55-60%.  Normal left ventricular diastolic filling is observed. The left  ventricular diastolic filling pattern is normal. No intracardiac mass is  observed.  No thrombus is visualized within the left ventricle.    Left Atrium:  The left atrial chamber size is normal.   Right Ventricle:  The right ventricular cavity size is normal. The right ventricular  global systolic function is normal.   Right Atrium:  The right atrial cavity size is normal.   Aortic Valve:  The aortic valve structure is normal. There is no evidence of aortic  stenosis. There is no evidence of aortic regurgitation.   Mitral Valve:  The mitral valve leaflets appear normal. There is no evidence of mitral  stenosis. There is a trace of mitral regurgitation.   Tricuspid Valve:  The tricuspid valve leaflets are normal.  There is a trace tricuspid  regurgitation.     Pulmonic Valve:  The pulmonic valve is not well visualized.    Pericardium:  There is no pericardial effusion.   Aorta:  There is no dilatation of the ascending aorta.  "      Assessment/ Plan    Active Hospital Problems (** Indicates Principal Problem)    Diagnosis Date Noted   • **UTI (urinary tract infection) with pyuria [N39.0] 04/10/2018   • New onset a-fib [I48.91] 04/11/2018   • Dementia [F03.90] 04/10/2018   • Encephalopathy [G93.40] 04/10/2018      Resolved Hospital Problems    Diagnosis Date Noted Date Resolved   No resolved problems to display.     Dementia with metabolic encephalopathy- ct head-IMPRESSION: No acute intracranial hemorrhage or hydrocephalus. Primary team foll.    UTI- urine and blood cx'x sent- rocephin, procalcitonin 0.04    htn- home meds resumed acei/bb    Dyslipidemia- statin    At fib- ? New onset, monitored reviewed and v rates mostly 80- 100, has had few pauses 1.4 sec longest with vr 46 with pause  Nl tsh and trop neg x1  H/o ef 55-60%  chadsvasc score 4 annual risk is 4.0%, has-bled score 2, low bleeding risk    Plan  Continue bb  Echo  md to follow for consideration of a.c.    KITTY Jones  04/11/18  8:52 AM    Discussed with dr reyna    Patient seen and examined.  Agree with the note above by Gita TANG and discussed the case with her.  The patient is an 89-year-old female who has a severe dementia which may be aggravated currently by acute urinary tract infection.  She is not able to give much of any history except that she denies any current chest pain or shortness of air as far as I can determine.  Few of systems is not possible due to the dementia.  When asked most questions she responds \"I don't know\".  On exam he has an irregular rhythm with no significant murmurs.  She has no JVD no definite carotid bruits.  Her chest sounds clear with no peripheral edema.  EKG demonstrates atrial fibrillation.  Chest x-ray does not demonstrate any acute abnormality.  Her lab work is reviewed and nothing significant on that except for the urinalysis.  She has atrial fibrillation which could be new but is of unknown duration.  Her " admission dose of carvedilol has been increased.  Her rate is still a little high.  We will see how she does on the increased dose of carvedilol and then decide if we need to make further adjustments in that and the ACE inhibitor.  Question of  Chronic anticoagulation in this patient is a difficult one.  We will see how she does today regarding the rhythm and discuss this further.  She did receive 1 dose of 90 mg of Lovenox at 2333 hrs yesterday.Will review the echocardiogram when completed and see if that will help us the question of anticoagulation.  Abraham Jimenez MD  Time: 50 min

## 2018-04-11 NOTE — PLAN OF CARE
Problem: Patient Care Overview  Goal: Plan of Care Review  Outcome: Ongoing (interventions implemented as appropriate)   04/11/18 3783   Coping/Psychosocial   Plan of Care Reviewed With patient   Plan of Care Review   Progress no change   OTHER   Outcome Summary pt a&o to self, vss,        Problem: Skin Injury Risk (Adult)  Goal: Skin Health and Integrity  Outcome: Ongoing (interventions implemented as appropriate)      Problem: Fall Risk (Adult)  Goal: Absence of Fall  Outcome: Ongoing (interventions implemented as appropriate)

## 2018-04-12 NOTE — PLAN OF CARE
Problem: Patient Care Overview  Goal: Plan of Care Review   04/12/18 1211   Coping/Psychosocial   Plan of Care Reviewed With patient   OTHER   Outcome Summary pt presents with weakness and impaired functional mobility from baseline. She needed assist to get out of bed and walk with walker 25 ft, pt dizzy with activity. Pt was pleasantly confused and followed commands well. Rec SNF at d/c

## 2018-04-12 NOTE — PROGRESS NOTES
LOS: 2 days     Name: Shira Sosa  Age/Sex: 89 y.o. female  :  1928        PCP: Clara Ann Pallares, MD    Subjective   Doing ok but not really answering questions appropriately.      Denies fevers chills shortness of breath cough, denies abdominal pain nausea or vomiting, denies headache or vision changes      aspirin 81 mg Oral Daily   atorvastatin 10 mg Oral Daily   carvedilol 6.25 mg Oral BID With Meals   ceftriaxone 1 g Intravenous Q24H   cholecalciferol 1,000 Units Oral Daily   donepezil 5 mg Oral Nightly   levothyroxine 25 mcg Oral Q AM   lisinopril 10 mg Oral Daily   memantine 10 mg Oral Q12H       sodium chloride 125 mL/hr Last Rate: 125 mL/hr (18 1631)       Objective   Vital Signs  Temp:  [97.6 °F (36.4 °C)-98.7 °F (37.1 °C)] 97.8 °F (36.6 °C)  Heart Rate:  [] 117  Resp:  [16-18] 16  BP: (152-161)/() 161/96  Body mass index is 31.06 kg/m².    Intake/Output Summary (Last 24 hours) at 18 0849  Last data filed at 18 1841   Gross per 24 hour   Intake              540 ml   Output              400 ml   Net              140 ml       Physical Exam   Constitutional: She appears well-nourished.   HENT:   Head: Normocephalic and atraumatic.   Eyes: EOM are normal. No scleral icterus.   Neck: Neck supple. No JVD present.   Cardiovascular: Normal rate and regular rhythm.    Pulmonary/Chest: Effort normal and breath sounds normal.   Abdominal: Soft. Bowel sounds are normal.   Musculoskeletal: She exhibits no edema.   Skin: Skin is warm and dry.   Nursing note and vitals reviewed.        Results Review:       I reviewed the patient's new clinical results.    Results from last 7 days  Lab Units 18  0637 04/10/18  1823   WBC 10*3/mm3 6.68 7.15   HEMOGLOBIN g/dL 12.2 13.7   PLATELETS 10*3/mm3 153 164       Results from last 7 days  Lab Units 18  0637 04/10/18  1823   SODIUM mmol/L 148* 145   POTASSIUM mmol/L 3.6 4.1   CHLORIDE mmol/L 112* 107   CO2 mmol/L 21.1*  25.6   BUN mg/dL 17 18   CREATININE mg/dL 0.72 0.82   CALCIUM mg/dL 8.3* 8.9   MAGNESIUM mg/dL  --  2.2   PHOSPHORUS mg/dL 2.8  --    Estimated Creatinine Clearance: 53.1 mL/min (by C-G formula based on SCr of 0.72 mg/dL).    Results from last 7 days  Lab Units 04/10/18  1825   NITRITE UA  Positive*   WBC UA /HPF Too Numerous to Count*   BACTERIA UA /HPF Unable to determine due to loaded field*   SQUAM EPITHEL UA /HPF Unable to determine due to loaded field*   URINECX    >100,000 CFU/mL Klebsiella oxytoca*       2/4/17- HOLTER MONITOR  FINDING(S):   1. The patient wore the monitor for 27 hours and 37 minutes with an   average heart rate of 63 and a range of .   2. Over 2370 PACs.   3. Over 1000 PVCs (6 couplets and 6 triplets).   4. No significant pauses.   5. Review of the rhythm strips reveals sinus rhythm with frequent atrial   and ventricular ectopy including multiple short bursts of narrow complex   tachycardia with a rate of approximately 145 beats per minute, the longest   of which was nearly 20 beats in   duration, but no sustained atrial or ventricular arrhythmias.   6. No diary was submitted for review.     2/4/17- Transthoracic Echo  Conclusions:  Global left ventricular wall motion and contractility are within normal  limits.  The EF 4ch was calculated at 64%.  The left ventricular diastolic filling pattern is consistent with  pseudonormalization.   The left ventricular diastolic filling pattern is consistent with  elevated mean left atrial pressure.  The left atrium is moderately enlarged.  Moderate to severe concentric left ventricular hypertrophy is observed.  There is no pericardial effusion.  There is borderline aortic root dilatation.    Assessment/Plan   Principal Problem:    UTI (urinary tract infection) with pyuria  Active Problems:    Dementia    Encephalopathy    New onset a-fib      PLAN  - conitnue rocephin for UTI, cipro on DC  - appreciate cardiac recs  - continue BB        Disposition  Maybe out tomorrow      Kyle Shoemaker MD  [facility]  04/12/18  8:49 AM

## 2018-04-12 NOTE — PROGRESS NOTES
Shira ERIC Sosa   89 y.o.  female    LOS: 2 days   Patient Care Team:  Clara Ann Pallares, MD as PCP - General (Internal Medicine)      Subjective     Interval History:     Patient Complaints: Confused difficult to obtain much history.  Seems to deny chest pain or shortness of air.    Review of Systems:   Severe dementia and confusion precludes any adequate review of systems    Medication Review:   Current Facility-Administered Medications:   •  aspirin EC tablet 81 mg, 81 mg, Oral, Daily, Kwadwo Zhao MD, 81 mg at 04/12/18 0817  •  atorvastatin (LIPITOR) tablet 10 mg, 10 mg, Oral, Daily, Kwadwo Zhao MD, 10 mg at 04/12/18 0817  •  carvedilol (COREG) tablet 12.5 mg, 12.5 mg, Oral, BID With Meals, Abraham Jimenez MD  •  cefTRIAXone (ROCEPHIN) IVPB 1 g, 1 g, Intravenous, Q24H, Kwadwo Zhao MD, Last Rate: 100 mL/hr at 04/11/18 1803, 1 g at 04/11/18 1803  •  cholecalciferol (VITAMIN D3) tablet 1,000 Units, 1,000 Units, Oral, Daily, Kwadwo Zhao MD, 1,000 Units at 04/12/18 0817  •  donepezil (ARICEPT) tablet 5 mg, 5 mg, Oral, Nightly, Kwadwo Zhao MD, 5 mg at 04/11/18 2023  •  levothyroxine (SYNTHROID, LEVOTHROID) tablet 25 mcg, 25 mcg, Oral, Q AM, Kwadwo Zhao MD, 25 mcg at 04/12/18 0548  •  lisinopril (PRINIVIL,ZESTRIL) tablet 10 mg, 10 mg, Oral, Daily, Kwadwo Zhao MD, 10 mg at 04/12/18 0817  •  memantine (NAMENDA) tablet 10 mg, 10 mg, Oral, Q12H, Kwadwo Zhao MD, 10 mg at 04/12/18 0817  •  sodium chloride 0.9 % flush 1-10 mL, 1-10 mL, Intravenous, PRN, Kwadwo Zhao MD  •  Insert peripheral IV, , , Once **AND** sodium chloride 0.9 % flush 10 mL, 10 mL, Intravenous, PRN, Domenic Menezes MD  •  sodium chloride 0.9 % infusion, 75 mL/hr, Intravenous, Continuous, Kyle Shoemaker MD, Last Rate: 75 mL/hr at 04/12/18 0912, 75 mL/hr at 04/12/18 0912      Objective     Vital Sign Min/Max for last 24 hours  Temp  Min: 97.6 °F  (36.4 °C)  Max: 98.7 °F (37.1 °C)   BP  Min: 147/100  Max: 161/96    Pulse  Min: 85  Max: 117     Wt Readings from Last 3 Encounters:   04/10/18 87.3 kg (192 lb 7 oz)   09/28/17 72.6 kg (160 lb)   10/24/16 72.6 kg (160 lb)        Intake/Output Summary (Last 24 hours) at 04/12/18 1215  Last data filed at 04/12/18 0900   Gross per 24 hour   Intake              780 ml   Output              400 ml   Net              380 ml     Physical Exam:      General Appearance:    Elderly female no acute distress   Head:    Normocephalic, atraumatic   Eyes:            Conjunctivae normal, non icteric, no xanthelasma   Neck:   no carotid bruit, no JVD   Lungs:     Clear to auscultation bilaterally. No wheezes, rhonchi or rales. No accessory muscle use.     Heart:    Irregular rate and rhythm.  Normal S1 and S2. No murmur, no gallop, rub or lift.    Chest Wall:    No abnormalities observed   Abdomen:     Normal bowel sounds, no masses, no organomegaly, soft        non-tender, non-distended, no guarding, no rebound                tenderness   Rectal:     Deferred   Extremities:   No clubbing, cyanosis oredema.     Pulses:   Pulses palpable and equal bilaterally   Skin:   No bleeding, bruising or rash   Neurologic:   awake, no facial drooping      Monitor:  Atrial fibrillation  Results Review:     I reviewed the patient's new clinical results.    Sodium Sodium   Date Value Ref Range Status   04/12/2018 148 (H) 136 - 145 mmol/L Final   04/10/2018 145 136 - 145 mmol/L Final      Potassium Potassium   Date Value Ref Range Status   04/12/2018 3.6 3.5 - 5.2 mmol/L Final   04/10/2018 4.1 3.5 - 5.2 mmol/L Final      Chloride Chloride   Date Value Ref Range Status   04/12/2018 112 (H) 98 - 107 mmol/L Final   04/10/2018 107 98 - 107 mmol/L Final      Bicarbonate No results found for: PLASMABICARB   BUN BUN   Date Value Ref Range Status   04/12/2018 17 8 - 23 mg/dL Final   04/10/2018 18 8 - 23 mg/dL Final      Creatinine Creatinine   Date Value  Ref Range Status   04/12/2018 0.72 0.57 - 1.00 mg/dL Final   04/10/2018 0.82 0.57 - 1.00 mg/dL Final      Calcium Calcium   Date Value Ref Range Status   04/12/2018 8.3 (L) 8.6 - 10.5 mg/dL Final   04/10/2018 8.9 8.6 - 10.5 mg/dL Final      Magnesium Magnesium   Date Value Ref Range Status   04/10/2018 2.2 1.6 - 2.4 mg/dL Final          Results from last 7 days  Lab Units 04/12/18  0637   WBC 10*3/mm3 6.68   HEMOGLOBIN g/dL 12.2   HEMATOCRIT % 39.8   PLATELETS 10*3/mm3 153       Lab Results  Lab Value Date/Time   TROPONINT <0.010 04/10/2018 1823   TROPONINT <0.010 09/28/2017 0034   TROPONINT <0.010 10/24/2016 1840   TROPONINT <0.01 02/03/2015 0456   TROPONINT <0.01 02/02/2015 0401   TROPONINT <0.01 02/01/2015 1331   TROPONINT <0.01 08/29/2014 1750     No results found for: CHOL  Lab Results   Component Value Date    HDL 50 02/03/2015     Lab Results   Component Value Date     (H) 02/03/2015     Lab Results   Component Value Date    TRIG 115 02/03/2015     No components found for: CHOLHDL  [unfilled]    Results from last 7 days  Lab Units 04/10/18  1823   INR  1.12*         Echo EF Estimated  Lab Results   Component Value Date    ECHOEFEST 60 04/11/2018          Assessment/ Plan  Atrial fibrillation, possibly of new onset but duration undetermined  Normal LVEF on current echocardiogram, significant left atrial dilatation  UTI  Significant dementia, very confused, this could be exacerbated by the UTI  History of hypertension, dyslipidemia  Plan #1 the ventricular rate is a little high at present time with atrial fibrillation and her blood pressures a little high.  Blood increase her carvedilol up to 12.5 twice a day.  She is also on was seen up real 10 mg daily.  #2 for now continue with the aspirin.  Still a difficult question as to whether to anticoagulate this 89-year-old female with such marked dementia.        Abraham Jimenez MD  04/12/18  12:15 PM      Time: 17 min

## 2018-04-12 NOTE — PLAN OF CARE
Problem: Patient Care Overview  Goal: Plan of Care Review  Outcome: Ongoing (interventions implemented as appropriate)   04/12/18 0413   Coping/Psychosocial   Plan of Care Reviewed With patient   Plan of Care Review   Progress no change   OTHER   Outcome Summary Pt had no s/s of pain or discomfort this shift. Pt alert to self only. Pt continues on RA , IV ABX and regular diet. IVF increased to 125ml/hr. VSS. Will continue to monitor.        Problem: Skin Injury Risk (Adult)  Goal: Skin Health and Integrity  Outcome: Ongoing (interventions implemented as appropriate)      Problem: Fall Risk (Adult)  Goal: Absence of Fall  Outcome: Ongoing (interventions implemented as appropriate)

## 2018-04-12 NOTE — THERAPY EVALUATION
Acute Care - Physical Therapy Initial Evaluation  [facility]     Patient Name: Shira Sosa  : 1928  MRN: [id number]  Today's Date: 2018   Onset of Illness/Injury or Date of Surgery: 04/10/18  Date of Referral to PT: 18  Referring Physician: Sahara      Admit Date: 4/10/2018    Visit Dx:     ICD-10-CM ICD-9-CM   1. UTI (urinary tract infection) with pyuria N39.0 599.0   2. Toxic encephalopathy G92 349.82   3. Impaired functional mobility and activity tolerance Z74.09 V49.89     Patient Active Problem List   Diagnosis   • UTI (urinary tract infection) with pyuria   • Dementia   • Encephalopathy   • New onset a-fib     Past Medical History:   Diagnosis Date   • Dementia    • Hyperlipidemia    • Hypertension      History reviewed. No pertinent surgical history.     PT ASSESSMENT (last 12 hours)      Physical Therapy Evaluation     Row Name 18 1200          PT Evaluation Time/Intention    Subjective Information complains of;weakness;fatigue;dizziness  -PC     Document Type evaluation  -PC     Mode of Treatment physical therapy  -PC     Patient Effort good  -PC     Symptoms Noted During/After Treatment dizziness  -PC     Comment dizziness with walking  -PC     Row Name 18 1200          General Information    Patient Profile Reviewed? yes  -PC     Onset of Illness/Injury or Date of Surgery 04/10/18  -PC     Referring Physician Sahara  -PADMAJA     Patient Observations lethargic  -PC     General Observations of Patient pt is in bed, no acute distress, awake and cooperative, pleasantly confused  -PC     Prior Level of Function --   pt at assisted living, unclear of level of assist  -PC     Equipment Currently Used at Home walker, rolling  -PC     Pertinent History of Current Functional Problem pt adm from assisted living with inc confusion, pt with UTI, encephalopathy  -PC     Existing Precautions/Restrictions fall  -PC     Barriers to Rehab cognitive status  -PC     Row Name 18  1200          Cognitive Assessment/Intervention- PT/OT    Orientation Status (Cognition) oriented to;person  -PC     Follows Commands (Cognition) follows one step commands;75-90% accuracy;verbal cues/prompting required  -PC     Safety Deficit (Cognitive) moderate deficit;insight into deficits/self awareness  -PC     Row Name 04/12/18 1200          Safety Issues, Functional Mobility    Safety Issues Affecting Function (Mobility) awareness of need for assistance;insight into deficits/self awareness  -PC     Impairments Affecting Function (Mobility) cognition;postural/trunk control;strength  -PC     Row Name 04/12/18 1200          Bed Mobility Assessment/Treatment    Bed Mobility Assessment/Treatment supine-sit;sit-supine  -PC     Supine-Sit [place] (Bed Mobility) minimum assist (75% patient effort)  -PC     Sit-Supine [place] (Bed Mobility) minimum assist (75% patient effort)  -PC     Bed Mobility, Safety Issues decreased use of arms for pushing/pulling;decreased use of legs for bridging/pushing;impaired trunk control for bed mobility  -PC     Row Name 04/12/18 1200          Transfer Assessment/Treatment    Transfer Assessment/Treatment sit-stand transfer;stand-sit transfer  -PC     Sit-Stand [place] (Transfers) minimum assist (75% patient effort)  -PC     Stand-Sit [place] (Transfers) minimum assist (75% patient effort)  -PC     Row Name 04/12/18 1200          Sit-Stand Transfer    Assistive Device (Sit-Stand Transfers) walker, front-wheeled  -PC     Row Name 04/12/18 1200          Stand-Sit Transfer    Assistive Device (Stand-Sit Transfers) walker, front-wheeled  -PC     Row Name 04/12/18 1200          Gait/Stairs Assessment/Training    [place] Level (Gait) minimum assist (75% patient effort)  -PC     Assistive Device (Gait) walker, front-wheeled  -PC     Distance in Feet (Gait) 25 ft  -PC     Pattern (Gait) step-to  -PC     Deviations/Abnormal Patterns (Gait) edgar  decreased;festinating/shuffling;stride length decreased  -PC     Bilateral Gait Deviations forward flexed posture  -PC     Row Name 04/12/18 1200          General ROM    GENERAL ROM COMMENTS BUE shoulder flexion 40% limited, others WFL  -PC     Row Name 04/12/18 1200          General Assessment (Manual Muscle Testing)    Comment, General Manual Muscle Testing (MMT) Assessment BUE 4-/5, BLE 4/5  -PC     Row Name 04/12/18 1200          Physical Therapy Clinical Impression    Date of Referral to PT 04/12/18  -PC     Criteria for Skilled Interventions Met (PT Clinical Impression) yes;treatment indicated  -PC     Impairments Found (describe specific impairments) gait, locomotion, and balance  -PC     Rehab Potential (PT Clinical Summary) fair, will monitor progress closely  -PC     Row Name 04/12/18 1200          Vital Signs    Post Treatment Diastolic BP --   BP post treatment was high, nsg aware and will retake  -PC     Row Name 04/12/18 1200          Physical Therapy Goals    Bed Mobility Goal Selection (PT) bed mobility, PT goal 1  -PC     Transfer Goal Selection (PT) transfer, PT goal 1  -PC     Gait Training Goal Selection (PT) gait training, PT goal 1  -PC     Row Name 04/12/18 1200          Bed Mobility Goal 1 (PT)    Activity/Assistive Device (Bed Mobility Goal 1, PT) sit to supine/supine to sit  -PC     Watson Level/Cues Needed (Bed Mobility Goal 1, PT) supervision required  -PC     Time Frame (Bed Mobility Goal 1, PT) 1 week  -PC     Row Name 04/12/18 1200          Transfer Goal 1 (PT)    Activity/Assistive Device (Transfer Goal 1, PT) sit-to-stand/stand-to-sit  -PC     Watson Level/Cues Needed (Transfer Goal 1, PT) supervision required  -PC     Time Frame (Transfer Goal 1, PT) 1 week  -PC     Row Name 04/12/18 1200          Gait Training Goal 1 (PT)    Activity/Assistive Device (Gait Training Goal 1, PT) gait (walking locomotion);walker, rolling  -PC     Watson Level (Gait Training Goal 1,  PT) supervision required  -PC     Distance (Gait Goal 1, PT) 50 ft  -PC     Time Frame (Gait Training Goal 1, PT) 1 week  -PC     Row Name 04/12/18 1200          Positioning and Restraints    Pre-Treatment Position in bed  -PC     Post Treatment Position bed  -PC     In Bed supine;call light within reach;encouraged to call for assist;exit alarm on;notified nsg  -PC       User Key  (r) = Recorded By, (t) = Taken By, (c) = Cosigned By    Initials Name Provider Type    PC Ana Burgess PT Physical Therapist          Physical Therapy Education     Title: PT OT SLP Therapies (Active)     Topic: Physical Therapy (Active)     Point: Mobility training (Active)    Learning Progress Summary     Learner Status Readiness Method Response Comment Documented by    Patient Active Acceptance E,D NR  PC 04/12/18 1210          Point: Home exercise program (Active)    Learning Progress Summary     Learner Status Readiness Method Response Comment Documented by    Patient Active Acceptance E,D NR  PC 04/12/18 1210          Point: Body mechanics (Active)    Learning Progress Summary     Learner Status Readiness Method Response Comment Documented by    Patient Active Acceptance E,D NR  PC 04/12/18 1210          Point: Precautions (Active)    Learning Progress Summary     Learner Status Readiness Method Response Comment Documented by    Patient Active Acceptance E,D NR  PC 04/12/18 1210                      User Key     Initials Effective Dates Name Provider Type Discipline     04/03/18 -  Ana Burgess, PT Physical Therapist PT                PT Recommendation and Plan  Anticipated Discharge Disposition (PT): skilled nursing facility (SNF)  Planned Therapy Interventions (PT Eval): gait training, bed mobility training, home exercise program, strengthening, transfer training  Therapy Frequency (PT Clinical Impression): daily  Outcome Summary/Treatment Plan (PT)  Anticipated Discharge Disposition (PT): skilled nursing facility  (SNF)  Plan of Care Reviewed With: patient  Outcome Summary: pt presents with weakness and impiared functional mobility from baseline.  She needed assist to get out of bed and walk with walker 25 ft, pt dizzy with activity. Rec SNF at d/c           Outcome Measures     Row Name 04/12/18 1200             How much help from another person do you currently need...    Turning from your back to your side while in flat bed without using bedrails? 3  -PC      Moving from lying on back to sitting on the side of a flat bed without bedrails? 3  -PC      Moving to and from a bed to a chair (including a wheelchair)? 3  -PC      Standing up from a chair using your arms (e.g., wheelchair, bedside chair)? 3  -PC      Climbing 3-5 steps with a railing? 2  -PC      To walk in hospital room? 3  -PC      AM-PAC 6 Clicks Score 17  -PC         Functional Assessment    Outcome Measure Options AM-PAC 6 Clicks Basic Mobility (PT)  -PC        User Key  (r) = Recorded By, (t) = Taken By, (c) = Cosigned By    Initials Name Provider Type    PC Ana Burgess, PT Physical Therapist           Time Calculation:         PT Charges     Row Name 04/12/18 1212             Time Calculation    Start Time 1135  -PC      Stop Time 1156  -PC      Time Calculation (min) 21 min  -PC      PT Received On 04/12/18  -PC      PT - Next Appointment 04/13/18  -PC      PT Goal Re-Cert Due Date 04/19/18  -PC        User Key  (r) = Recorded By, (t) = Taken By, (c) = Cosigned By    Initials Name Provider Type    PC Ana Burgess, PT Physical Therapist          Therapy Charges for Today     Code Description Service Date Service Provider Modifiers Qty    15898533999 HC PT EVAL MOD COMPLEXITY 2 4/12/2018 Ana Burgess, PT GP 1    03016543554 HC PT THER PROC EA 15 MIN 4/12/2018 Ana Burgess, PT GP 1          PT G-Codes  Outcome Measure Options: AM-PAC 6 Clicks Basic Mobility (PT)      Ana Burgess PT  4/12/2018

## 2018-04-12 NOTE — PROGRESS NOTES
Continued Stay Note  [facility]     Patient Name: Shira Sosa  MRN: [id number]  Today's Date: 4/12/2018    Admit Date: 4/10/2018          Discharge Plan     Row Name 04/12/18 1644       Plan    Plan Comments PT notes  reviewed with [facility] , Tejal ( [phone]).  She agrees with PT that patient will benefit from SNF at [place].  Called and disucssed with son, Bart Duenas ( [phone]).  He would prefer for patient to return to [facility] with Home Health and he would like to discuss this with Tejal before making any decisions.  CCP will follow up in AM. Savanah Gipson RN              Discharge Codes    No documentation.           Savanah Gipson RN

## 2018-04-13 NOTE — PLAN OF CARE
Problem: Patient Care Overview  Goal: Plan of Care Review  Outcome: Ongoing (interventions implemented as appropriate)   04/13/18 6986   Coping/Psychosocial   Plan of Care Reviewed With patient   Plan of Care Review   Progress improving   OTHER   Outcome Summary pt walking a little better today, but still confused and requiring assist, at this level pt would not be safe to be alone, rec snf or 24 hour assist

## 2018-04-13 NOTE — PLAN OF CARE
Problem: Patient Care Overview  Goal: Plan of Care Review  Outcome: Ongoing (interventions implemented as appropriate)   04/13/18 5970   Coping/Psychosocial   Plan of Care Reviewed With patient   Plan of Care Review   Progress no change   OTHER   Outcome Summary Pt had no indication of pain or discomfort this shift. Pt alert and confused at baseline. Pt coreg increased to BID. IVF decreased to 75ml/hr. Pt B/P elevated, notified MD, one time dose of norvasc orderd. Otherwise, VSS. Will continue to monitor.        Problem: Skin Injury Risk (Adult)  Goal: Skin Health and Integrity  Outcome: Ongoing (interventions implemented as appropriate)      Problem: Fall Risk (Adult)  Goal: Absence of Fall  Outcome: Ongoing (interventions implemented as appropriate)

## 2018-04-13 NOTE — THERAPY TREATMENT NOTE
Acute Care - Physical Therapy Treatment Note  [facility]     Patient Name: Shira Sosa  : 1928  MRN: [id number]  Today's Date: 2018  Onset of Illness/Injury or Date of Surgery: 04/10/18  Date of Referral to PT: 18  Referring Physician: Sahara    Admit Date: 4/10/2018    Visit Dx:    ICD-10-CM ICD-9-CM   1. UTI (urinary tract infection) with pyuria N39.0 599.0   2. Toxic encephalopathy G92 349.82   3. Impaired functional mobility and activity tolerance Z74.09 V49.89     Patient Active Problem List   Diagnosis   • UTI (urinary tract infection) with pyuria   • Dementia   • Encephalopathy   • New onset a-fib       Therapy Treatment          Rehabilitation Treatment Summary     Row Name 18 1151             Treatment Time/Intention    Discipline physical therapist  -PC      Document Type therapy note (daily note)  -PC      Subjective Information complains of;weakness;fatigue  -PC      Mode of Treatment physical therapy  -PC      Patient/Family Observations pt is in bed, awake, confused  -PC      Therapy Frequency (PT Clinical Impression) daily  -PC      Patient Effort good  -PC      Existing Precautions/Restrictions fall  -PC      Recorded by [PC] Ana Burgess, PT 18 1154 18 1154      Row Name 18 1151             Cognitive Assessment/Intervention- PT/OT    Orientation Status (Cognition) oriented to;person  -PC      Follows Commands (Cognition) follows one step commands;75-90% accuracy;verbal cues/prompting required  -PC      Safety Deficit (Cognitive) moderate deficit;insight into deficits/self awareness  -PC      Personal Safety Interventions fall prevention program maintained;gait belt  -PC      Recorded by [PC] Ana Burgess, PT 18 1154 18 1154      Row Name 18 1151             Safety Issues, Functional Mobility    Safety Issues Affecting Function (Mobility) awareness of need for assistance;judgment;insight into deficits/self awareness  -PC       Impairments Affecting Function (Mobility) cognition;postural/trunk control;strength  -PC      Recorded by [PC] Ana Burgess, PT 04/13/18 1154 04/13/18 1154      Row Name 04/13/18 1151             Bed Mobility Assessment/Treatment    Bed Mobility Assessment/Treatment supine-sit;sit-supine  -PC      Supine-Sit [place] (Bed Mobility) minimum assist (75% patient effort)  -PC      Sit-Supine [place] (Bed Mobility) minimum assist (75% patient effort)  -PC      Bed Mobility, Safety Issues decreased use of arms for pushing/pulling;decreased use of legs for bridging/pushing;impaired trunk control for bed mobility  -PC      Recorded by [PC] Ana Burgess, PT 04/13/18 1154 04/13/18 1154      Row Name 04/13/18 1151             Transfer Assessment/Treatment    Transfer Assessment/Treatment sit-stand transfer;stand-sit transfer  -PC      Sit-Stand [place] (Transfers) minimum assist (75% patient effort)  -PC      Stand-Sit [place] (Transfers) minimum assist (75% patient effort)  -PC      Recorded by [PC] Ana Burgess, PT 04/13/18 1154 04/13/18 1154      Row Name 04/13/18 1151             Sit-Stand Transfer    Assistive Device (Sit-Stand Transfers) walker, front-wheeled  -PC      Recorded by [PC] Ana Burgess, PT 04/13/18 1154 04/13/18 1154      Row Name 04/13/18 1151             Stand-Sit Transfer    Assistive Device (Stand-Sit Transfers) walker, front-wheeled  -PC      Recorded by [PC] Ana Burgess, PT 04/13/18 1154 04/13/18 1154      Row Name 04/13/18 1151             Gait/Stairs Assessment/Training    Gait/Stairs Assessment/Training gait/ambulation independence  -PC      [place] Level (Gait) minimum assist (75% patient effort)  -PC      Assistive Device (Gait) walker, front-wheeled  -PC      Distance in Feet (Gait) 60 ft  -PC      Pattern (Gait) step-through  -PC      Deviations/Abnormal Patterns (Gait) edgar decreased;stride length decreased  -PC      Bilateral Gait Deviations forward  flexed posture  -PC      Comment (Gait/Stairs) pt taking steps with inc stride length  -PC      Recorded by [PC] Ana Burgess, PT 04/13/18 1154 04/13/18 1154      Row Name 04/13/18 1151             Positioning and Restraints    Pre-Treatment Position in bed  -PC      Post Treatment Position chair  -PC      In Chair sitting;call light within reach;encouraged to call for assist;exit alarm on;notified nsg  -PC      Recorded by [PC] Ana Burgess, PT 04/13/18 1154 04/13/18 1154      Row Name 04/13/18 1151             Pain Assessment    Additional Documentation --   no c/o pain  -PC      Recorded by [PC] Ana Burgess, PT 04/13/18 1154 04/13/18 1154      Row Name 04/13/18 1151             Outcome Summary/Treatment Plan (PT)    Anticipated Discharge Disposition (PT) skilled nursing facility (SNF)  -PC      Recorded by [PC] Ana Burgess, PT 04/13/18 1154 04/13/18 1154        User Key  (r) = Recorded By, (t) = Taken By, (c) = Cosigned By    Initials Name Effective Dates Discipline    PC Ana Burgess, PT 04/03/18 -  PT                     Physical Therapy Education     Title: PT OT SLP Therapies (Active)     Topic: Physical Therapy (Active)     Point: Mobility training (Active)    Learning Progress Summary     Learner Status Readiness Method Response Comment Documented by    Patient Active Acceptance E,D NR  PC 04/13/18 1155     Active Acceptance E,D NR  PC 04/12/18 1210          Point: Home exercise program (Active)    Learning Progress Summary     Learner Status Readiness Method Response Comment Documented by    Patient Active Acceptance E,D NR  PC 04/13/18 1155     Active Acceptance E,D NR  PC 04/12/18 1210          Point: Body mechanics (Active)    Learning Progress Summary     Learner Status Readiness Method Response Comment Documented by    Patient Active Acceptance E,D NR  PC 04/13/18 1155     Active Acceptance E,D NR  PC 04/12/18 1210          Point: Precautions (Active)    Learning Progress Summary      Learner Status Readiness Method Response Comment Documented by    Patient Active Acceptance E,D NR  PC 04/13/18 1155     Active Acceptance E,D NR  PC 04/12/18 1210                      User Key     Initials Effective Dates Name Provider Type Discipline     04/03/18 -  Ana Burgess, PT Physical Therapist PT                    PT Recommendation and Plan  Anticipated Discharge Disposition (PT): skilled nursing facility (SNF)  Planned Therapy Interventions (PT Eval): gait training, bed mobility training, home exercise program, strengthening, transfer training  Therapy Frequency (PT Clinical Impression): daily  Outcome Summary/Treatment Plan (PT)  Anticipated Discharge Disposition (PT): skilled nursing facility (SNF)  Plan of Care Reviewed With: patient  Progress: improving  Outcome Summary: pt walking a little better today, but still confused and requiring assist, at this level pt would not be safe to be alone, rec snf or 24 hour assist          Outcome Measures     Row Name 04/13/18 1100 04/12/18 1200          How much help from another person do you currently need...    Turning from your back to your side while in flat bed without using bedrails? 3  -PC 3  -PC     Moving from lying on back to sitting on the side of a flat bed without bedrails? 3  -PC 3  -PC     Moving to and from a bed to a chair (including a wheelchair)? 3  -PC 3  -PC     Standing up from a chair using your arms (e.g., wheelchair, bedside chair)? 3  -PC 3  -PC     Climbing 3-5 steps with a railing? 2  -PC 2  -PC     To walk in hospital room? 3  -PC 3  -PC     AM-PAC 6 Clicks Score 17  -PC 17  -PC        Functional Assessment    Outcome Measure Options  -- AM-PAC 6 Clicks Basic Mobility (PT)  -PC       User Key  (r) = Recorded By, (t) = Taken By, (c) = Cosigned By    Initials Name Provider Type    PC Ana Burgess, PT Physical Therapist           Time Calculation:         PT Charges     Row Name 04/13/18 1156             Time Calculation    Start  Time 1049  -PC      Stop Time 1101  -PC      Time Calculation (min) 12 min  -PC      PT Received On 04/13/18  -PC      PT - Next Appointment 04/14/18  -PC        User Key  (r) = Recorded By, (t) = Taken By, (c) = Cosigned By    Initials Name Provider Type    PC Ana Burgess, PT Physical Therapist          Therapy Charges for Today     Code Description Service Date Service Provider Modifiers Qty    91830665620 HC PT EVAL MOD COMPLEXITY 2 4/12/2018 Ana Burgess, PT GP 1    93037204395 HC PT THER PROC EA 15 MIN 4/12/2018 Ana Burgess, PT GP 1    24851304121 HC PT THER PROC EA 15 MIN 4/13/2018 Ana Burgess, PT GP 1          PT G-Codes  Outcome Measure Options: AM-PAC 6 Clicks Basic Mobility (PT)    Ana Burgess, PT  4/13/2018

## 2018-04-13 NOTE — PROGRESS NOTES
Continued Stay Note  [facility]     Patient Name: Shira Sosa  MRN: [id number]  Today's Date: 4/13/2018    Admit Date: 4/10/2018          Discharge Plan     Row Name 04/13/18 1328       Plan    Plan [facility] pending [facility] pre-cert    Patient/Family in Agreement with Plan yes    Plan Comments Spoke with son and he is interested in [facility] for rehab.  Spoke with [facility] and [facility] has a bed and they will start pre-cert, but do not anticipate getting pre-cert until Monday.  Son, Johann Duenas, updated and aware.  He states that patient will need transportation to facility.  If she does not medically require an ambulance, he would prefer a wheelchair van take her and he can pay for it with a credit card.  Savanah Gipson RN              Discharge Codes    No documentation.           Savanah Gipson RN

## 2018-04-13 NOTE — PROGRESS NOTES
Shira ERIC Sosa   89 y.o.  female    LOS: 3 days   Patient Care Team:  Clara Ann Pallares, MD as PCP - General (Internal Medicine)      Subjective     Interval History:     Patient Complaints: when asked how do you feel she answers I don't know, when asked directly does anything hurt she states no. When asked if she feels soa she states a little    Review of Systems:   Unable to obtain adequate review of systems due to severe dementia    Medication Review:   Current Facility-Administered Medications:   •  aspirin EC tablet 81 mg, 81 mg, Oral, Daily, Kwadwo Zhao MD, 81 mg at 04/13/18 0808  •  atorvastatin (LIPITOR) tablet 10 mg, 10 mg, Oral, Daily, Kwadwo Zhao MD, 10 mg at 04/13/18 0808  •  carvedilol (COREG) tablet 12.5 mg, 12.5 mg, Oral, BID With Meals, Abraham Jimenez MD, 12.5 mg at 04/13/18 0808  •  cefTRIAXone (ROCEPHIN) IVPB 1 g, 1 g, Intravenous, Q24H, Kwadwo Zhao MD, Last Rate: 100 mL/hr at 04/12/18 2021, 1 g at 04/12/18 2021  •  cholecalciferol (VITAMIN D3) tablet 1,000 Units, 1,000 Units, Oral, Daily, Kwadwo Zhao MD, 1,000 Units at 04/13/18 0808  •  donepezil (ARICEPT) tablet 5 mg, 5 mg, Oral, Nightly, Kwadwo Zhao MD, 5 mg at 04/12/18 2021  •  levothyroxine (SYNTHROID, LEVOTHROID) tablet 25 mcg, 25 mcg, Oral, Q AM, Kwadwo Zhao MD, 25 mcg at 04/13/18 0549  •  lisinopril (PRINIVIL,ZESTRIL) tablet 10 mg, 10 mg, Oral, Daily, Kwadwo Zhao MD, 10 mg at 04/13/18 0808  •  memantine (NAMENDA) tablet 10 mg, 10 mg, Oral, Q12H, Kwadwo Zhao MD, 10 mg at 04/13/18 0808  •  sodium chloride 0.9 % flush 1-10 mL, 1-10 mL, Intravenous, PRN, Kwadwo Zhao MD  •  Insert peripheral IV, , , Once **AND** sodium chloride 0.9 % flush 10 mL, 10 mL, Intravenous, PRN, Domenic Menezes MD      Objective   Vital Sign Min/Max for last 24 hours  Temp  Min: 97.6 °F (36.4 °C)  Max: 97.9 °F (36.6 °C)   BP  Min: 153/108  Max: 177/116    Pulse   Min: 85  Max: 119     Wt Readings from Last 3 Encounters:   04/10/18 87.3 kg (192 lb 7 oz)   09/28/17 72.6 kg (160 lb)   10/24/16 72.6 kg (160 lb)        Intake/Output Summary (Last 24 hours) at 04/13/18 0951  Last data filed at 04/12/18 2100   Gross per 24 hour   Intake              480 ml   Output                0 ml   Net              480 ml     I/o is inaccurate- ivf's at 75cc/hr since 4/11 at midnight, incontinent    Physical Exam:      General Appearance:    Well developed and well nourished elderly confused wf in no acute distress   Head:    Normocephalic, atraumatic   Eyes:            Conjunctivae normal, non icteric, no xanthelasma   Neck:   supple, trachea midline, no thyromegaly, no carotid bruit, no jvd, no elevated cvp   Lungs:     Clear to auscultation,respirations regular, even and                  unlabored    Heart:    Irregular rhythm and normal rate, normal S1 and S2,            No murmur, no gallop, no rub, no click   Chest Wall:    No abnormalities observed   Abdomen:     Normal bowel sounds, no masses, no organomegaly, soft        non-tender, non-distended, no guarding, no rebound                tenderness   Rectal:     Deferred   Extremities:   No edema. Moves all extremities well, no cyanosis, no erythema   Pulses:   Pulses palpable and equal bilaterally   Skin:   No bleeding, bruising or rash   Neurologic:   awake alert and oriented x3, speech clear and approp, no facial drooping     : voids   Monitor:  At fib cvr 86    Results Review:         Sodium Sodium   Date Value Ref Range Status   04/12/2018 148 (H) 136 - 145 mmol/L Final   04/10/2018 145 136 - 145 mmol/L Final      Potassium Potassium   Date Value Ref Range Status   04/12/2018 3.6 3.5 - 5.2 mmol/L Final   04/10/2018 4.1 3.5 - 5.2 mmol/L Final      Chloride Chloride   Date Value Ref Range Status   04/12/2018 112 (H) 98 - 107 mmol/L Final   04/10/2018 107 98 - 107 mmol/L Final      Bicarbonate No results found for: PLASMABICARB    BUN BUN   Date Value Ref Range Status   04/12/2018 17 8 - 23 mg/dL Final   04/10/2018 18 8 - 23 mg/dL Final      Creatinine Creatinine   Date Value Ref Range Status   04/12/2018 0.72 0.57 - 1.00 mg/dL Final   04/10/2018 0.82 0.57 - 1.00 mg/dL Final      Calcium Calcium   Date Value Ref Range Status   04/12/2018 8.3 (L) 8.6 - 10.5 mg/dL Final   04/10/2018 8.9 8.6 - 10.5 mg/dL Final      Magnesium Magnesium   Date Value Ref Range Status   04/10/2018 2.2 1.6 - 2.4 mg/dL Final          Results from last 7 days  Lab Units 04/12/18  0637   WBC 10*3/mm3 6.68   HEMOGLOBIN g/dL 12.2   HEMATOCRIT % 39.8   PLATELETS 10*3/mm3 153       Lab Results  Lab Value Date/Time   TROPONINT <0.010 04/10/2018 1823   TROPONINT <0.010 09/28/2017 0034   TROPONINT <0.010 10/24/2016 1840   TROPONINT <0.01 02/03/2015 0456   TROPONINT <0.01 02/02/2015 0401   TROPONINT <0.01 02/01/2015 1331   TROPONINT <0.01 08/29/2014 1750     No results found for: CHOL  Lab Results   Component Value Date    HDL 50 02/03/2015     Lab Results   Component Value Date     (H) 02/03/2015     Lab Results   Component Value Date    TRIG 115 02/03/2015     No components found for: CHOLHDL  No results found for: PTT  No components found for: PT/INR  Lab Results   Component Value Date    HGBA1C 5.8 (H) 02/03/2015      Lab Results   Component Value Date    TSH 3.180 04/10/2018    TSH 3.150 04/10/2018        Echo EF Estimated  Lab Results   Component Value Date    ECHOEFEST 60 04/11/2018         Assessment/ Plan    Active Hospital Problems (** Indicates Principal Problem)    Diagnosis Date Noted   • **UTI (urinary tract infection) with pyuria [N39.0] 04/10/2018   • New onset a-fib [I48.91] 04/11/2018   • Dementia [F03.90] 04/10/2018   • Encephalopathy [G93.40] 04/10/2018      Resolved Hospital Problems    Diagnosis Date Noted Date Resolved   No resolved problems to display.     Atrial fibrillation, possibly of new onset but duration undetermined  Normal LVEF on  current echocardiogram, significant left atrial dilatation  UTI  Significant dementia,  this could be exacerbated by the UTI  History of hypertension,   dyslipidemia    Plan  Adjust bp meds--norvasc 5mg qd, zestril 20mg qd and coreg 25mgbid  ivf dc'd    Gita Ortiz, APRN  04/13/18  9:51 AM  Patient seen and examined.  Agree with the note above by Gita Ortiz APRN.  Due to severe dementia difficult to obtain much history.  She seems to deny any chest pain or significant shortness of air.  On exam no definite JVD.  Chest exam difficult probably clear.  Rhythm irregular I don't hear a significant murmur, no significant edema.  Meds have been adjusted as above.  Continue aspirin.  No other suggestions at present.  Abarham Jimenez MD  Time 17

## 2018-04-13 NOTE — PLAN OF CARE
Problem: Patient Care Overview  Goal: Plan of Care Review  Outcome: Ongoing (interventions implemented as appropriate)   04/13/18 1512   Coping/Psychosocial   Plan of Care Reviewed With patient   Plan of Care Review   Progress no change   OTHER   Outcome Summary pt sat in chair post PT , PT state p requires SNF , pt confused and letharig this am , sat in c hair this afternoon , md Ordered seroquel for night to help pt sleep , monitor QTC per Dr. Pinto , pt to possibily go to [facility] , pre cert started , IV rocephin for UTI, Afib , ra , some high bp , meds adj per Cards, will cont to monitor ,      Goal: Discharge Needs Assessment  Outcome: Ongoing (interventions implemented as appropriate)      Problem: Skin Injury Risk (Adult)  Goal: Skin Health and Integrity  Outcome: Ongoing (interventions implemented as appropriate)      Problem: Fall Risk (Adult)  Goal: Absence of Fall  Outcome: Ongoing (interventions implemented as appropriate)

## 2018-04-13 NOTE — PROGRESS NOTES
LOS: 3 days     Name: Shira Sosa  Age/Sex: 89 y.o. female  :  1928        PCP: Clara Ann Pallares, MD    Subjective   Doing ok but not really answering questions appropriately.  A little more sleepy today.  But denies any new issues    Denies fevers chills shortness of breath cough, denies abdominal pain nausea or vomiting, denies headache or vision changes      aspirin 81 mg Oral Daily   atorvastatin 10 mg Oral Daily   carvedilol 12.5 mg Oral BID With Meals   ceftriaxone 1 g Intravenous Q24H   cholecalciferol 1,000 Units Oral Daily   donepezil 5 mg Oral Nightly   levothyroxine 25 mcg Oral Q AM   lisinopril 10 mg Oral Daily   memantine 10 mg Oral Q12H       sodium chloride 75 mL/hr Last Rate: 75 mL/hr (18 0204)       Objective   Vital Signs  Temp:  [97.6 °F (36.4 °C)-97.9 °F (36.6 °C)] 97.8 °F (36.6 °C)  Heart Rate:  [] 101  Resp:  [16-20] 18  BP: (153-177)/(100-133) 166/100  Body mass index is 31.06 kg/m².    Intake/Output Summary (Last 24 hours) at 18 0946  Last data filed at 18 2100   Gross per 24 hour   Intake              480 ml   Output                0 ml   Net              480 ml       Physical Exam   Constitutional: She appears well-nourished.   HENT:   Head: Normocephalic and atraumatic.   Eyes: EOM are normal. No scleral icterus.   Neck: Neck supple. No JVD present.   Cardiovascular: Normal rate and regular rhythm.    Pulmonary/Chest: Effort normal and breath sounds normal.   Abdominal: Soft. Bowel sounds are normal.   Musculoskeletal: She exhibits no edema.   Skin: Skin is warm and dry.   Nursing note and vitals reviewed.    Results Review:       I reviewed the patient's new clinical results.    Results from last 7 days  Lab Units 18  0637 04/10/18  1823   WBC 10*3/mm3 6.68 7.15   HEMOGLOBIN g/dL 12.2 13.7   PLATELETS 10*3/mm3 153 164       Results from last 7 days  Lab Units 18  0637 04/10/18  1823   SODIUM mmol/L 148* 145   POTASSIUM mmol/L 3.6  4.1   CHLORIDE mmol/L 112* 107   CO2 mmol/L 21.1* 25.6   BUN mg/dL 17 18   CREATININE mg/dL 0.72 0.82   CALCIUM mg/dL 8.3* 8.9   MAGNESIUM mg/dL  --  2.2   PHOSPHORUS mg/dL 2.8  --    Estimated Creatinine Clearance: 53.1 mL/min (by C-G formula based on SCr of 0.72 mg/dL).    Results from last 7 days  Lab Units 04/10/18  1825   NITRITE UA  Positive*   WBC UA /HPF Too Numerous to Count*   BACTERIA UA /HPF Unable to determine due to loaded field*   SQUAM EPITHEL UA /HPF Unable to determine due to loaded field*   URINECX    >100,000 CFU/mL Klebsiella oxytoca*       2/4/17- HOLTER MONITOR  FINDING(S):   1. The patient wore the monitor for 27 hours and 37 minutes with an   average heart rate of 63 and a range of .   2. Over 2370 PACs.   3. Over 1000 PVCs (6 couplets and 6 triplets).   4. No significant pauses.   5. Review of the rhythm strips reveals sinus rhythm with frequent atrial   and ventricular ectopy including multiple short bursts of narrow complex   tachycardia with a rate of approximately 145 beats per minute, the longest   of which was nearly 20 beats in   duration, but no sustained atrial or ventricular arrhythmias.   6. No diary was submitted for review.     2/4/17- Transthoracic Echo  Conclusions:  Global left ventricular wall motion and contractility are within normal  limits.  The EF 4ch was calculated at 64%.  The left ventricular diastolic filling pattern is consistent with  pseudonormalization.   The left ventricular diastolic filling pattern is consistent with  elevated mean left atrial pressure.  The left atrium is moderately enlarged.  Moderate to severe concentric left ventricular hypertrophy is observed.  There is no pericardial effusion.  There is borderline aortic root dilatation.    Assessment/Plan   Principal Problem:    UTI (urinary tract infection) with pyuria  Active Problems:    Dementia    Encephalopathy    New onset a-fib      PLAN  - conitnue rocephin for UTI, cipro on DC  - BP  up at times but she is not really symptomatic with this.  Norvasc added today will follow how her BP does with this.  DCing fluids may help with this as well.  Discussed with cardiology and further adjustments have been made  - sleepy this am, awake most of the night some impulsivity issues throughout the afternoon and overnight consider try low dose seroquel tonight and see how she does her qtc was a little long on admission will repeat ekg now and monitor qtc if less than 450 can try seroquel  - dc fluids she looks much less dry  - appreciate cardiac recs  - continue BB (increased), HR still high at times  - It may not be a great idea to anticoagulate this lady especially if plan is to return to assisted living where he fall risk will be elevated      Disposition  Will need SNF, DC when bed available      Kyle Shoemaker MD  [facility]  04/13/18  9:46 AM

## 2018-04-13 NOTE — SIGNIFICANT NOTE
Pt had elevated BP throughout night, notified MD. LANCASTER ordered one time dose Norvasc 10mg. Pt currently has /98. Notified MD. LANCASTER stated that that is ok and would see patient on morning round. No new orders at this time.

## 2018-04-13 NOTE — DISCHARGE PLACEMENT REQUEST
"Shira Fletcher (89 y.o. Female)     Date of Birth Social Security Number Address Home Phone MRN    08/29/1928  [address]    [place] [facility] [facility] [address] [phone] [id number]    Jainism Marital Status          None        Admission Date Admission Type Admitting Provider Attending Provider Department, Room/Bed    4/10/18 Emergency Kwadwo Zhao MD Richards, Kyle HIDALGO MD [address], 613/1    Discharge Date Discharge Disposition Discharge Destination                       Attending Provider:  Kyle Shoemaker MD    Allergies:  No Known Allergies    Isolation:  None   Infection:  None   Code Status:  FULL    Ht:  167.6 cm (66\")   Wt:  87.3 kg (192 lb 7 oz)    Admission Cmt:  None   Principal Problem:  UTI (urinary tract infection) with pyuria [N39.0]                 Active Insurance as of 4/10/2018     Primary Coverage     Payor Plan Insurance Group Employer/Plan Group    ANTH MEDICARE REPLACEMENT [facility] MEDICARE ADVANTAGE [id number]     Payor Plan Address Payor Plan Phone Number Effective From Effective To    [address] [phone] 1/1/2016     [place] [address]       Subscriber Name Subscriber Birth Date Member ID       SHIRA FLETCHER 8/29/1928 [id number]                 Emergency Contacts      (Rel.) Home Phone Work Phone Mobile Phone    Bart Duenas (Son) [phone]    Sara Duenas (Son) [phone] -- --              "

## 2018-04-14 NOTE — PROGRESS NOTES
LOS: 4 days     Name: Shira Sosa  Age/Sex: 89 y.o. female  :  1928        PCP: Clara Ann Pallares, MD    Subjective   Doing ok but not really answering questions appropriately.  Even more sleepy today.  But denies any new issues    Denies fevers chills shortness of breath cough, denies abdominal pain nausea or vomiting, denies headache or vision changes      amLODIPine 5 mg Oral Q24H   aspirin 81 mg Oral Daily   atorvastatin 10 mg Oral Daily   carvedilol 25 mg Oral BID With Meals   ceftriaxone 1 g Intravenous Q24H   cholecalciferol 1,000 Units Oral Daily   donepezil 5 mg Oral Nightly   levothyroxine 25 mcg Oral Q AM   lisinopril 20 mg Oral Daily   memantine 10 mg Oral Q12H   QUEtiapine 12.5 mg Oral Nightly          Objective   Vital Signs  Temp:  [97.5 °F (36.4 °C)-98 °F (36.7 °C)] 98 °F (36.7 °C)  Heart Rate:  [] 92  Resp:  [16] 16  BP: (111-169)/() 147/98  Body mass index is 31.06 kg/m².    Intake/Output Summary (Last 24 hours) at 18 1310  Last data filed at 18 1333   Gross per 24 hour   Intake              240 ml   Output                0 ml   Net              240 ml       Physical Exam   Constitutional: She appears well-nourished.   HENT:   Head: Normocephalic and atraumatic.   Eyes: EOM are normal. No scleral icterus.   Neck: Neck supple. No JVD present.   Cardiovascular: Normal rate and regular rhythm.    Pulmonary/Chest: Effort normal and breath sounds normal.   Abdominal: Soft. Bowel sounds are normal.   Musculoskeletal: She exhibits no edema.   Skin: Skin is warm and dry.   Nursing note and vitals reviewed.    Results Review:       I reviewed the patient's new clinical results.    Results from last 7 days  Lab Units 18  0637 04/10/18  1823   WBC 10*3/mm3 6.68 7.15   HEMOGLOBIN g/dL 12.2 13.7   PLATELETS 10*3/mm3 153 164       Results from last 7 days  Lab Units 18  0637 04/10/18  1823   SODIUM mmol/L 148* 145   POTASSIUM mmol/L 3.6 4.1   CHLORIDE  mmol/L 112* 107   CO2 mmol/L 21.1* 25.6   BUN mg/dL 17 18   CREATININE mg/dL 0.72 0.82   CALCIUM mg/dL 8.3* 8.9   MAGNESIUM mg/dL  --  2.2   PHOSPHORUS mg/dL 2.8  --    Estimated Creatinine Clearance: 53.1 mL/min (by C-G formula based on SCr of 0.72 mg/dL).    Results from last 7 days  Lab Units 04/10/18  1825   NITRITE UA  Positive*   WBC UA /HPF Too Numerous to Count*   BACTERIA UA /HPF Unable to determine due to loaded field*   SQUAM EPITHEL UA /HPF Unable to determine due to loaded field*   URINECX    >100,000 CFU/mL Klebsiella oxytoca*       2/4/17- HOLTER MONITOR  FINDING(S):   1. The patient wore the monitor for 27 hours and 37 minutes with an   average heart rate of 63 and a range of .   2. Over 2370 PACs.   3. Over 1000 PVCs (6 couplets and 6 triplets).   4. No significant pauses.   5. Review of the rhythm strips reveals sinus rhythm with frequent atrial   and ventricular ectopy including multiple short bursts of narrow complex   tachycardia with a rate of approximately 145 beats per minute, the longest   of which was nearly 20 beats in   duration, but no sustained atrial or ventricular arrhythmias.   6. No diary was submitted for review.     2/4/17- Transthoracic Echo  Conclusions:  Global left ventricular wall motion and contractility are within normal  limits.  The EF 4ch was calculated at 64%.  The left ventricular diastolic filling pattern is consistent with  pseudonormalization.   The left ventricular diastolic filling pattern is consistent with  elevated mean left atrial pressure.  The left atrium is moderately enlarged.  Moderate to severe concentric left ventricular hypertrophy is observed.  There is no pericardial effusion.  There is borderline aortic root dilatation.    Assessment/Plan   Principal Problem:    UTI (urinary tract infection) with pyuria  Active Problems:    Dementia    Encephalopathy    New onset a-fib      PLAN  - conitnue rocephin for UTI, cipro on DC  - BP up at times but  she is not really symptomatic with this.  Norvasc added today will follow how her BP does with this.  DCing fluids may help with this as well.  Discussed with cardiology and further adjustments have been made  - still mayito sleepy no seroquel now likely a little much too sleepy today  - eating and drinking ok  - appreciate cardiac recs  - continue BB (increased), HR still high at times  - It may not be a great idea to anticoagulate this lady especially if plan is to return to assisted living where he fall risk will be elevated      Disposition  Will need SNF, DC when bed available      Kyle Shoemaker MD  [facility]  04/14/18  1:10 PM

## 2018-04-14 NOTE — SIGNIFICANT NOTE
04/14/18 1027   Rehab Time/Intention   Evaluation Not Performed patient/family decline, not feeling well;other (see comments)  (pt extremely lethargic; would not arouse enough to participate in therapy.)   Rehab Treatment   Discipline physical therapist   Recommendation   PT - Next Appointment 04/15/18

## 2018-04-14 NOTE — PLAN OF CARE
Problem: Patient Care Overview  Goal: Plan of Care Review  Outcome: Ongoing (interventions implemented as appropriate)   04/14/18 0310   Coping/Psychosocial   Plan of Care Reviewed With patient   Plan of Care Review   Progress improving   OTHER   Outcome Summary Pt continues to be confused. First dose of seroquel administered with good response. Sleeping better this shift. No new conceerns. VSS. Will continue to monitor.     Goal: Individualization and Mutuality  Outcome: Ongoing (interventions implemented as appropriate)    Goal: Discharge Needs Assessment  Outcome: Ongoing (interventions implemented as appropriate)      Problem: Skin Injury Risk (Adult)  Goal: Skin Health and Integrity  Outcome: Ongoing (interventions implemented as appropriate)      Problem: Fall Risk (Adult)  Goal: Absence of Fall  Outcome: Ongoing (interventions implemented as appropriate)

## 2018-04-14 NOTE — PLAN OF CARE
Problem: Patient Care Overview  Goal: Plan of Care Review  Outcome: Ongoing (interventions implemented as appropriate)   04/14/18 1819   Coping/Psychosocial   Plan of Care Reviewed With patient   Plan of Care Review   Progress improving   OTHER   Outcome Summary Pt had no c/o of pain this shift. Pt is alert to self. Early in the shift was drowsy due to seroquel dose from previous shift. Pt now waking up more and becoming more alert. Tolerating medications. Q2 turns. Incontinence care. VSS. Will continue to monitor.        Problem: Skin Injury Risk (Adult)  Goal: Skin Health and Integrity  Outcome: Ongoing (interventions implemented as appropriate)   04/14/18 1819   Skin Injury Risk (Adult)   Skin Health and Integrity making progress toward outcome       Problem: Fall Risk (Adult)  Goal: Absence of Fall  Outcome: Ongoing (interventions implemented as appropriate)   04/14/18 1819   Fall Risk (Adult)   Absence of Fall making progress toward outcome

## 2018-04-14 NOTE — PROGRESS NOTES
"S: no dyspnea, no chest discomfort  O: /98   Pulse 92   Temp 98 °F (36.7 °C) (Oral)   Resp 16   Ht 167.6 cm (66\")   Wt 87.3 kg (192 lb 7 oz)   SpO2 92%   BMI 31.06 kg/m²   Neck: no jvd  Chest: clear  Cor: no lift, normal s1 and s2, no murmur, no s3  Ab: soft, no tenderness  Ex: no edema    A: Newly identified atrial fibrillation, duration unknown       Mild rapid response, rate controlled with carvedilol      Not a candidate for systemic anticoagulation due to advanced dementia and associated risk of falls     BP control is adequate      Echocardiogram: LVEF = 60%, severe left atrium dilatation    P: no changes  "

## 2018-04-15 NOTE — PLAN OF CARE
Problem: Patient Care Overview  Goal: Plan of Care Review  Outcome: Ongoing (interventions implemented as appropriate)   04/15/18 050   Coping/Psychosocial   Plan of Care Reviewed With patient   Plan of Care Review   Progress no change   OTHER   Outcome Summary No complaints this shift. Alert to self only. Incontinent of bowel and bladder. Q2 turns. VSS, will continue to monitor.      Goal: Interprofessional Rounds/Family Conf  Outcome: Ongoing (interventions implemented as appropriate)      Problem: Skin Injury Risk (Adult)  Goal: Skin Health and Integrity  Outcome: Ongoing (interventions implemented as appropriate)      Problem: Fall Risk (Adult)  Goal: Absence of Fall  Outcome: Ongoing (interventions implemented as appropriate)

## 2018-04-15 NOTE — PLAN OF CARE
Problem: Patient Care Overview  Goal: Plan of Care Review   04/15/18 0957   OTHER   Outcome Summary Slow progress towards goals during PT, up in chair upon arrival. Pt initially declining PT, agreeable to standing exercises with minimal encouragement. Minimal assist, RW, and cues for proper UE support. Limited by fatigue/weakness. Recommend DC to SNU.

## 2018-04-15 NOTE — PROGRESS NOTES
LOS: 5 days     Name: Shira Sosa  Age/Sex: 89 y.o. female  :  1928        PCP: Clara Ann Pallares, MD    Subjective   She is much more awake yesterday in the afternoon and is much more awake this morning.  She's eating breakfast answering questions appropriately and denies any new issues or complaints.    Denies fevers chills shortness of breath cough, denies abdominal pain nausea or vomiting, denies headache or vision changes      amLODIPine 5 mg Oral Q24H   aspirin 81 mg Oral Daily   atorvastatin 10 mg Oral Daily   carvedilol 25 mg Oral BID With Meals   ceftriaxone 1 g Intravenous Q24H   cholecalciferol 1,000 Units Oral Daily   donepezil 5 mg Oral Nightly   levothyroxine 25 mcg Oral Q AM   lisinopril 20 mg Oral Daily   memantine 10 mg Oral Q12H          Objective   Vital Signs  Temp:  [97.3 °F (36.3 °C)-98.2 °F (36.8 °C)] 98.2 °F (36.8 °C)  Heart Rate:  [87-99] 99  Resp:  [16-18] 18  BP: (112-169)/() 161/102  Body mass index is 31.06 kg/m².    Intake/Output Summary (Last 24 hours) at 04/15/18 0745  Last data filed at 18 1835   Gross per 24 hour   Intake              260 ml   Output                0 ml   Net              260 ml       Physical Exam   Constitutional: She appears well-nourished.   HENT:   Head: Normocephalic and atraumatic.   Eyes: EOM are normal. No scleral icterus.   Neck: Neck supple. No JVD present.   Cardiovascular: Normal rate and regular rhythm.    Pulmonary/Chest: Effort normal and breath sounds normal.   Abdominal: Soft. Bowel sounds are normal.   Musculoskeletal: She exhibits no edema.   Skin: Skin is warm and dry.   Nursing note and vitals reviewed.    Results Review:       I reviewed the patient's new clinical results.    Results from last 7 days  Lab Units 18  0637 04/10/18  1823   WBC 10*3/mm3 6.68 7.15   HEMOGLOBIN g/dL 12.2 13.7   PLATELETS 10*3/mm3 153 164       Results from last 7 days  Lab Units 18  0637 04/10/18  1823   SODIUM mmol/L  148* 145   POTASSIUM mmol/L 3.6 4.1   CHLORIDE mmol/L 112* 107   CO2 mmol/L 21.1* 25.6   BUN mg/dL 17 18   CREATININE mg/dL 0.72 0.82   CALCIUM mg/dL 8.3* 8.9   MAGNESIUM mg/dL  --  2.2   PHOSPHORUS mg/dL 2.8  --    Estimated Creatinine Clearance: 53.1 mL/min (by C-G formula based on SCr of 0.72 mg/dL).    Results from last 7 days  Lab Units 04/10/18  1825   NITRITE UA  Positive*   WBC UA /HPF Too Numerous to Count*   BACTERIA UA /HPF Unable to determine due to loaded field*   SQUAM EPITHEL UA /HPF Unable to determine due to loaded field*   URINECX    >100,000 CFU/mL Klebsiella oxytoca*       2/4/17- HOLTER MONITOR  FINDING(S):   1. The patient wore the monitor for 27 hours and 37 minutes with an   average heart rate of 63 and a range of .   2. Over 2370 PACs.   3. Over 1000 PVCs (6 couplets and 6 triplets).   4. No significant pauses.   5. Review of the rhythm strips reveals sinus rhythm with frequent atrial   and ventricular ectopy including multiple short bursts of narrow complex   tachycardia with a rate of approximately 145 beats per minute, the longest   of which was nearly 20 beats in   duration, but no sustained atrial or ventricular arrhythmias.   6. No diary was submitted for review.     2/4/17- Transthoracic Echo  Conclusions:  Global left ventricular wall motion and contractility are within normal  limits.  The EF 4ch was calculated at 64%.  The left ventricular diastolic filling pattern is consistent with  pseudonormalization.   The left ventricular diastolic filling pattern is consistent with  elevated mean left atrial pressure.  The left atrium is moderately enlarged.  Moderate to severe concentric left ventricular hypertrophy is observed.  There is no pericardial effusion.  There is borderline aortic root dilatation.    Assessment/Plan   Principal Problem:    UTI (urinary tract infection) with pyuria  Active Problems:    Dementia    Encephalopathy    New onset a-fib      PLAN  - conitnue  rocephin for UTI, cipro on DC  - BP Remains elevated we'll increase her Norvasc to 10 mg, she is not having any symptoms with this.  - Would avoid Seroquel and other sedating medications  - eating and drinking ok  - appreciate cardiac recs  - continue BB (increased), HR still high at times  - It may not be a great idea to anticoagulate this lady especially if plan is to return to assisted living where he fall risk will be elevated      Disposition  Will need SNF, DC when bed available      Kyle Shoemaker MD  [facility]  04/15/18  7:45 AM

## 2018-04-15 NOTE — PROGRESS NOTES
"S: no dyspnea, no palpitation    In bed, inactive, no distress  /68 (BP Location: Right arm, Patient Position: Lying)   Pulse 65   Temp 98.1 °F (36.7 °C) (Oral)   Resp 18   Ht 167.6 cm (66\")   Wt 87.3 kg (192 lb 7 oz)   SpO2 92%   BMI 31.06 kg/m²       Neck: no jvd  Chest: clear  Cor: no lift, normal s1 and s2, no murmur, no s3  Ab: soft, no tenderness  Ex: no edema     A: Newly identified atrial fibrillation, duration unknown       rate controlled with carvedilol      Not a candidate for systemic anticoagulation due to advanced dementia and associated risk of falls     BP elevated: amlodipine added      Echocardiogram: LVEF = 60%, severe left atrium dilatation     P: no changes  "

## 2018-04-15 NOTE — THERAPY TREATMENT NOTE
Acute Care - Physical Therapy Treatment Note  [facility]     Patient Name: Shira Sosa  : 1928  MRN: [id number]  Today's Date: 4/15/2018  Onset of Illness/Injury or Date of Surgery: 04/10/18  Date of Referral to PT: 18  Referring Physician: [place]    Admit Date: 4/10/2018    Visit Dx:    ICD-10-CM ICD-9-CM   1. UTI (urinary tract infection) with pyuria N39.0 599.0   2. Toxic encephalopathy G92 349.82   3. Impaired functional mobility and activity tolerance Z74.09 V49.89     Patient Active Problem List   Diagnosis   • UTI (urinary tract infection) with pyuria   • Dementia   • Encephalopathy   • New onset a-fib       Therapy Treatment          Rehabilitation Treatment Summary     Row Name 04/15/18 0918             Treatment Time/Intention    Discipline physical therapist  -AR      Document Type therapy note (daily note)  -AR      Subjective Information complains of;weakness;fatigue  -AR      Mode of Treatment physical therapy  -AR      Patient Effort good  -AR      Recorded by [AR] Kayy Rodriguez, PT 04/15/18 0956 04/15/18 0956      Row Name 04/15/18 0918             Vital Signs    O2 Delivery Pre Treatment room air  -AR      Recorded by [AR] Kayy Rodriguez, PT 04/15/18 0956 04/15/18 0956      Row Name 04/15/18 0918             Cognitive Assessment/Intervention- PT/OT    Orientation Status (Cognition) disoriented to;place;situation  -AR      Follows Commands (Cognition) follows one step commands;delayed response/completion;verbal cues/prompting required;repetition of directions required  -AR      Recorded by [AR] Kayy Rodriguez, PT 04/15/18 0956 04/15/18 0956      Row Name 04/15/18 0918             Bed Mobility Assessment/Treatment    Supine-Sit [place] (Bed Mobility) not tested  -AR      Recorded by [AR] Kayy Rodriguez, PT 04/15/18 0956 04/15/18 0956      Row Name 04/15/18 0918             Transfer Assessment/Treatment    Sit-Stand [place] (Transfers) minimum assist (75%  patient effort)  -AR      Stand-Sit [place] (Transfers) minimum assist (75% patient effort)  -AR      Recorded by [AR] Kayy Rodriguez, PT 04/15/18 0956 04/15/18 0956      Row Name 04/15/18 0918             Sit-Stand Transfer    Assistive Device (Sit-Stand Transfers) walker, front-wheeled  -AR      Recorded by [AR] Kayy Rodriguez, PT 04/15/18 0956 04/15/18 0956      Row Name 04/15/18 0918             Stand-Sit Transfer    Assistive Device (Stand-Sit Transfers) walker, front-wheeled  -AR      Recorded by [AR] Kayy Rodriguez, PT 04/15/18 0956 04/15/18 0956      Row Name 04/15/18 0918             Gait/Stairs Assessment/Training    Comment (Gait/Stairs) pt declined, agreeable to standing exercises only  -AR      Recorded by [AR] Kayy Rodriguez, PT 04/15/18 0956 04/15/18 0956      Row Name 04/15/18 0918             Motor Skills Assessment/Interventions    Additional Documentation Therapeutic Exercise Interventions (Group);Therapeutic Exercise (Group)  -AR      Recorded by [AR] Kayy Rodriguez, PT 04/15/18 0956 04/15/18 0956      Row Name 04/15/18 0918             Therapeutic Exercise    Lower Extremity (Therapeutic Exercise) marching while standing   10x, RW, CGA, frequent cues for participation  -AR      Recorded by [AR] Kayy Rodriguez, PT 04/15/18 0956 04/15/18 0956      Row Name 04/15/18 0918             Positioning and Restraints    Pre-Treatment Position sitting in chair/recliner  -AR      Post Treatment Position chair  -AR      In Chair sitting;call light within reach;encouraged to call for assist;exit alarm on  -AR      Recorded by [AR] Kayy Rodriguez, PT 04/15/18 0956 04/15/18 0956      Row Name 04/15/18 0918             Pain Assessment    Additional Documentation Pain Scale: Word Pre/Post-Treatment (Group)  -AR      Recorded by [AR] Kayy Rodriguez, PT 04/15/18 0956 04/15/18 0956      Row Name 04/15/18 0918             Pain Scale: Word Pre/Post-Treatment    Pain: Word Scale, Pretreatment 0 - no pain   -AR      Recorded by [AR] Kayy Rodriguez, PT 04/15/18 0956 04/15/18 0956      Row Name 04/15/18 0918             Outcome Summary/Treatment Plan (PT)    Anticipated Discharge Disposition (PT) skilled nursing facility (SNF)  -AR      Recorded by [AR] Kayy Rodriguez, PT 04/15/18 0956 04/15/18 0956        User Key  (r) = Recorded By, (t) = Taken By, (c) = Cosigned By    Initials Name Effective Dates Discipline    AR Kayy Rodriguez, PT 04/03/18 -  PT                     Physical Therapy Education     Title: PT OT SLP Therapies (Active)     Topic: Physical Therapy (Active)     Point: Mobility training (Active)    Learning Progress Summary     Learner Status Readiness Method Response Comment Documented by    Patient Active Acceptance E NR  AR 04/15/18 0957     Active Acceptance E,D NR  PC 04/13/18 1155     Active Acceptance E,D NR  PC 04/12/18 1210          Point: Home exercise program (Active)    Learning Progress Summary     Learner Status Readiness Method Response Comment Documented by    Patient Active Acceptance E NR  AR 04/15/18 0957     Active Acceptance E,D NR  PC 04/13/18 1155     Active Acceptance E,D NR  PC 04/12/18 1210          Point: Body mechanics (Active)    Learning Progress Summary     Learner Status Readiness Method Response Comment Documented by    Patient Active Acceptance E NR  AR 04/15/18 0957     Active Acceptance E,D NR  PC 04/13/18 1155     Active Acceptance E,D NR  PC 04/12/18 1210          Point: Precautions (Active)    Learning Progress Summary     Learner Status Readiness Method Response Comment Documented by    Patient Active Acceptance E NR  AR 04/15/18 0957     Active Acceptance E,D NR  PC 04/13/18 1155     Active Acceptance E,D NR  PC 04/12/18 1210                      User Key     Initials Effective Dates Name Provider Type Discipline    PC 04/03/18 -  Ana Bugress, PT Physical Therapist PT    AR 04/03/18 -  Kayy Rodriguez, PT Physical Therapist PT                    PT  Recommendation and Plan  Anticipated Discharge Disposition (PT): skilled nursing facility (SNF)  Outcome Summary/Treatment Plan (PT)  Anticipated Discharge Disposition (PT): skilled nursing facility (SNF)  Outcome Summary: Slow progress towards goals during PT, up in chair upon arrival.  Pt initially declining PT, agreeable to standing exercises with minimal encouragement.  Minimal assist, RW, and cues for proper UE support.  Limited by fatigue/weakness.  Recommend DC to SNU.            Outcome Measures     Row Name 04/15/18 0900 04/13/18 1100 04/12/18 1200       How much help from another person do you currently need...    Turning from your back to your side while in flat bed without using bedrails? 3  -AR 3  -PC 3  -PC    Moving from lying on back to sitting on the side of a flat bed without bedrails? 3  -AR 3  -PC 3  -PC    Moving to and from a bed to a chair (including a wheelchair)? 3  -AR 3  -PC 3  -PC    Standing up from a chair using your arms (e.g., wheelchair, bedside chair)? 3  -AR 3  -PC 3  -PC    Climbing 3-5 steps with a railing? 2  -AR 2  -PC 2  -PC    To walk in hospital room? 3  -AR 3  -PC 3  -PC    AM-PAC 6 Clicks Score 17  -AR 17  -PC 17  -PC       Functional Assessment    Outcome Measure Options  --  -- AM-PAC 6 Clicks Basic Mobility (PT)  -PC      User Key  (r) = Recorded By, (t) = Taken By, (c) = Cosigned By    Initials Name Provider Type    PADMAJA Burgess, PT Physical Therapist    MIHAI Rodriguez PT Physical Therapist           Time Calculation:         PT Charges     Row Name 04/15/18 0958 04/15/18 0950          Time Calculation    Start Time 0917  -AR 0917  -AR     Stop Time 0929  -AR 0929  -AR     Time Calculation (min) 12 min  -AR 12 min  -AR     PT Received On 04/15/18  -AR  --     PT - Next Appointment 04/16/18  -AR  --       User Key  (r) = Recorded By, (t) = Taken By, (c) = Cosigned By    Initials Name Provider Type    MIHAI Rodriguez, PT Physical Therapist          Therapy  Charges for Today     Code Description Service Date Service Provider Modifiers Qty    31299674751 HC PT THER PROC EA 15 MIN 4/15/2018 Kayy Rodriguez, PT GP 1    23513925030 HC PT THER SUPP EA 15 MIN 4/15/2018 Kayy Rodriguez, PT GP 1          PT G-Codes  Outcome Measure Options: AM-PAC 6 Clicks Basic Mobility (PT)    Kayy Rodrgiuez, PT  4/15/2018

## 2018-04-16 NOTE — THERAPY TREATMENT NOTE
Acute Care - Physical Therapy Treatment Note  [facility]     Patient Name: Shira Sosa  : 1928  MRN: [id number]  Today's Date: 2018  Onset of Illness/Injury or Date of Surgery: 04/10/18  Date of Referral to PT: 18  Referring Physician: Sahara    Admit Date: 4/10/2018    Visit Dx:    ICD-10-CM ICD-9-CM   1. UTI (urinary tract infection) with pyuria N39.0 599.0   2. Toxic encephalopathy G92 349.82   3. Impaired functional mobility and activity tolerance Z74.09 V49.89     Patient Active Problem List   Diagnosis   • UTI (urinary tract infection) with pyuria   • Dementia   • Encephalopathy   • New onset a-fib       Therapy Treatment          Rehabilitation Treatment Summary     Row Name 18 1156             Treatment Time/Intention    Discipline physical therapy assistant  -RW      Document Type therapy note (daily note)  -RW      Subjective Information no complaints  -RW      Patient Effort good  -RW      Existing Precautions/Restrictions fall  -RW      Recorded by [RW] Radha Simpson PTA 18 1156 18 1156      Row Name 18 1156             Cognitive Assessment/Intervention- PT/OT    Orientation Status (Cognition) oriented to;person;place  -RW      Follows Commands (Cognition) follows one step commands;75-90% accuracy;verbal cues/prompting required;repetition of directions required  -RW      Personal Safety Interventions fall prevention program maintained;gait belt;nonskid shoes/slippers when out of bed  -RW      Recorded by [RW] Radha Simpson PTA 18 1206 18 1206      Row Name 18 1156             Safety Issues, Functional Mobility    Safety Issues Affecting Function (Mobility) at risk behavior observed;awareness of need for assistance;insight into deficits/self awareness;safety precaution awareness;safety precautions follow-through/compliance  -RW      Impairments Affecting Function (Mobility) balance;cognition;endurance/activity  tolerance;strength  -RW      Recorded by [RW] Radha Simpson, PTA 04/16/18 1206 04/16/18 1206      Row Name 04/16/18 1156             Bed Mobility Assessment/Treatment    Supine-Sit Kidder (Bed Mobility) minimum assist (75% patient effort);verbal cues;nonverbal cues (demo/gesture)  -RW      Sit-Supine Kidder (Bed Mobility) not tested   Pt up in chair  -RW      Bed Mobility, Safety Issues decreased use of arms for pushing/pulling  -RW      Assistive Device (Bed Mobility) bed rails;head of bed elevated  -RW      Recorded by [RW] Radha Simpson, PTA 04/16/18 1205 04/16/18 1205      Row Name 04/16/18 1156             Transfer Assessment/Treatment    Transfer Assessment/Treatment sit-stand transfer;stand-sit transfer;toilet transfer  -RW      Comment (Transfers) Assisted to restroom and over to chair  -RW      Sit-Stand Kidder (Transfers) minimum assist (75% patient effort);verbal cues  -RW      Stand-Sit Kidder (Transfers) minimum assist (75% patient effort);verbal cues  -RW      Kidder Level (Toilet Transfer) moderate assist (50% patient effort);verbal cues;nonverbal cues (demo/gesture)  -RW      Assistive Device (Toilet Transfer) grab bars/safety frame;walker, front-wheeled  -RW      Recorded by [RW] Radha Simpson, PTA 04/16/18 1205 04/16/18 1205      Row Name 04/16/18 1156             Sit-Stand Transfer    Assistive Device (Sit-Stand Transfers) walker, front-wheeled  -RW      Recorded by [RW] Radha Simpson, PTA 04/16/18 1205 04/16/18 1205      Row Name 04/16/18 1156             Stand-Sit Transfer    Assistive Device (Stand-Sit Transfers) walker, front-wheeled  -RW      Recorded by [RW] Radha Simpson, PTA 04/16/18 1205 04/16/18 1205      Row Name 04/16/18 1156             Toilet Transfer    Type (Toilet Transfer) sit-stand;stand-sit  -RW      Recorded by [RW] Radha Simpson, PTA 04/16/18 1205 04/16/18 1205      Row Name 04/16/18 1156             Gait/Stairs Assessment/Training     Eudora Level (Gait) minimum assist (75% patient effort);verbal cues  -RW      Assistive Device (Gait) walker, front-wheeled  -RW      Distance in Feet (Gait) 30  -RW      Pattern (Gait) step-through  -RW      Deviations/Abnormal Patterns (Gait) edgar decreased;gait speed decreased;stride length decreased  -RW      Bilateral Gait Deviations forward flexed posture  -RW      Comment (Gait/Stairs) Pt declined further ambulation  -RW      Recorded by [RW] Radha Simpson, [facility] 04/16/18 1205 04/16/18 1205      Row Name 04/16/18 1156             Positioning and Restraints    Pre-Treatment Position in bed  -RW      Post Treatment Position chair  -RW      In Chair sitting;call light within reach;encouraged to call for assist;exit alarm on  -RW      Recorded by [RW] Radha Simpson, [facility] 04/16/18 1205 04/16/18 1205      Row Name 04/16/18 1156             Pain Scale: Word Pre/Post-Treatment    Pain: Word Scale, Pretreatment 0 - no pain  -RW      Recorded by [RW] Radha Simpson, [facility] 04/16/18 1205 04/16/18 1205      Row Name 04/16/18 1156             Outcome Summary/Treatment Plan (PT)    Anticipated Discharge Disposition (PT) skilled nursing facility (SNF)  -RW      Recorded by [RW] Radha Simpson, [facility] 04/16/18 1205 04/16/18 1205        User Key  (r) = Recorded By, (t) = Taken By, (c) = Cosigned By    Initials Name Effective Dates Discipline     Radha ZALDIVAR Tatiana, [facility] 03/07/18 -  PT                     Physical Therapy Education     Title: PT OT SLP Therapies (Active)     Topic: Physical Therapy (Active)     Point: Mobility training (Done)    Learning Progress Summary     Learner Status Readiness Method Response Comment Documented by    Patient Done Acceptance E,TB,D VU,NR  RW 04/16/18 1205     Active Acceptance E NR  AR 04/15/18 0957     Active Acceptance E,D NR  PC 04/13/18 1155     Active Acceptance E,D NR  PC 04/12/18 1210          Point: Home exercise program (Active)    Learning Progress Summary     Learner Status  Readiness Method Response Comment Documented by    Patient Active Acceptance E NR  AR 04/15/18 0957     Active Acceptance E,D NR  PC 04/13/18 1155     Active Acceptance E,D NR  PC 04/12/18 1210          Point: Body mechanics (Done)    Learning Progress Summary     Learner Status Readiness Method Response Comment Documented by    Patient Done Acceptance E,TB,D VU,NR  RW 04/16/18 1205     Active Acceptance E NR  AR 04/15/18 0957     Active Acceptance E,D NR  PC 04/13/18 1155     Active Acceptance E,D NR  PC 04/12/18 1210          Point: Precautions (Done)    Learning Progress Summary     Learner Status Readiness Method Response Comment Documented by    Patient Done Acceptance E,TB,D VU,NR   04/16/18 1205     Active Acceptance E NR  AR 04/15/18 0957     Active Acceptance E,D NR  PC 04/13/18 1155     Active Acceptance E,D NR  PC 04/12/18 1210                      User Key     Initials Effective Dates Name Provider Type Discipline     04/03/18 -  Ana Burgess, PT Physical Therapist PT    AR 04/03/18 -  Kayy Rodriguez, PT Physical Therapist PT     03/07/18 -  Radha Simpson PTA Physical Therapy Assistant PT                    PT Recommendation and Plan  Anticipated Discharge Disposition (PT): skilled nursing facility (SNF)  Outcome Summary/Treatment Plan (PT)  Anticipated Discharge Disposition (PT): skilled nursing facility (SNF)  Plan of Care Reviewed With: patient  Outcome Summary: Pt able to ambulate to restroom and over to chair for lunch. Cueing required for safety w/ all activity.           Outcome Measures     Row Name 04/16/18 1205 04/15/18 0900          How much help from another person do you currently need...    Turning from your back to your side while in flat bed without using bedrails? 3  -RW 3  -AR     Moving from lying on back to sitting on the side of a flat bed without bedrails? 3  -RW 3  -AR     Moving to and from a bed to a chair (including a wheelchair)? 3  -RW 3  -AR     Standing up from a  chair using your arms (e.g., wheelchair, bedside chair)? 3  -RW 3  -AR     Climbing 3-5 steps with a railing? 2  -RW 2  -AR     To walk in hospital room? 3  -RW 3  -AR     AM-PAC 6 Clicks Score 17  -RW 17  -AR        Functional Assessment    Outcome Measure Options AM-PAC 6 Clicks Basic Mobility (PT)  -RW  --       User Key  (r) = Recorded By, (t) = Taken By, (c) = Cosigned By    Initials Name Provider Type    MIHAI Rodriguez, PT Physical Therapist    RW Radha Simpson PTA Physical Therapy Assistant           Time Calculation:         PT Charges     Row Name 04/16/18 1155             Time Calculation    Start Time 1152  -RW      Stop Time 1206  -RW      Time Calculation (min) 14 min  -RW      PT Received On 04/16/18  -RW      PT - Next Appointment 04/17/18  -        User Key  (r) = Recorded By, (t) = Taken By, (c) = Cosigned By    Initials Name Provider Type     Radha Simpson PTA Physical Therapy Assistant          Therapy Charges for Today     Code Description Service Date Service Provider Modifiers Qty    [id number] HC PT THER PROC EA 15 MIN 4/16/2018 Radha Simpson PTA GP 1          PT G-Codes  Outcome Measure Options: AM-PAC 6 Clicks Basic Mobility (PT)    Radha Simpson PTA  4/16/2018

## 2018-04-16 NOTE — PROGRESS NOTES
"  Name: Shira Sosa  ADMIT: 4/10/2018   Age/Sex: 89 y.o.female LOS:  LOS: 6 days    :    1928     ROOM: [facility]   MRN:    [id number]    PCP:    Clara Ann Pallares, MD     Subjective   Doing ok. No complaints. No sob or pain. Still confused    Objective   Vital Signs  Temp:  [97.6 °F (36.4 °C)-99.2 °F (37.3 °C)] 98.9 °F (37.2 °C)  Heart Rate:  [65-99] 99  Resp:  [16] 16  BP: (106-151)/(68-97) 132/97  Body mass index is 31.06 kg/m².    Objective:  General Appearance:  Comfortable and well-appearing.    Vital signs: (most recent): Blood pressure 132/97, pulse 99, temperature 98.9 °F (37.2 °C), temperature source Oral, resp. rate 16, height 167.6 cm (66\"), weight 87.3 kg (192 lb 7 oz), SpO2 92 %.  Vital signs are normal.    HEENT: Normal HEENT exam.    Lungs:  Normal effort.  Breath sounds clear to auscultation.    Heart: Normal rate.  Irregular rhythm.    Abdomen: Abdomen is soft and non-distended.  Bowel sounds are normal.   There is no abdominal tenderness.     Extremities: There is no deformity or dependent edema.    Neurological: Patient is alert.  (Oriented x1).    Skin:  Warm and dry.  No rash.             Results Review:       I reviewed the patient's new clinical results.    Results from last 7 days  Lab Units 18  0637 04/10/18  1823   WBC 10*3/mm3 6.68 7.15   HEMOGLOBIN g/dL 12.2 13.7   PLATELETS 10*3/mm3 153 164     Results from last 7 days  Lab Units 04/15/18  0713 18  0637 04/10/18  1823   SODIUM mmol/L 144 148* 145   POTASSIUM mmol/L 3.3* 3.6 4.1   CHLORIDE mmol/L 108* 112* 107   CO2 mmol/L 22.0 21.1* 25.6   BUN mg/dL 12 17 18   CREATININE mg/dL 0.66 0.72 0.82   GLUCOSE mg/dL 81 93 97   Estimated Creatinine Clearance: 53.1 mL/min (by C-G formula based on SCr of 0.66 mg/dL).  Results from last 7 days  Lab Units 04/15/18  0713 18  0637 04/10/18  1823   CALCIUM mg/dL 8.6 8.3* 8.9   ALBUMIN g/dL 3.10* 3.30* 3.90   MAGNESIUM mg/dL  --   --  2.2   PHOSPHORUS mg/dL 3.1 2.8  --  "       RADIOLOGY  4/16/2018  Pending      amLODIPine 10 mg Oral Q24H   aspirin 81 mg Oral Daily   atorvastatin 10 mg Oral Daily   carvedilol 25 mg Oral BID With Meals   ceftriaxone 1 g Intravenous Q24H   cholecalciferol 1,000 Units Oral Daily   donepezil 5 mg Oral Nightly   levothyroxine 25 mcg Oral Q AM   lisinopril 20 mg Oral Daily   memantine 10 mg Oral Q12H      Diet Regular      Assessment/Plan   Assessment:    Principal Problem:    UTI (urinary tract infection) with pyuria  Active Problems:    Dementia    Encephalopathy    New onset a-fib        Plan:   1. UTI  - on day 7 of Rocephin for Klebsiella UTI. D/c after last dose today    2. AFib  - rate control  - not a good long term a/c candidate  - cont to monitor    3. Dementia  - to go to SNF for rehab at discharge      Disposition  Whenever bed available      Kwadwo Zhao MD  [facility]  04/16/18  8:55 AM

## 2018-04-16 NOTE — PLAN OF CARE
Problem: Patient Care Overview  Goal: Plan of Care Review  Outcome: Ongoing (interventions implemented as appropriate)   04/16/18 8834   Coping/Psychosocial   Plan of Care Reviewed With patient   Plan of Care Review   Progress no change   OTHER   Outcome Summary Pt to d/c.       Problem: Fall Risk (Adult)  Goal: Absence of Fall  Outcome: Ongoing (interventions implemented as appropriate)

## 2018-04-16 NOTE — DISCHARGE SUMMARY
"       Date of Admission: 4/10/2018  Date of Discharge:  4/16/2018    PCP: Clara Ann Pallares, MD      DISCHARGE DIAGNOSIS  Principal Problem:    UTI (urinary tract infection) with pyuria  Active Problems:    Dementia    Encephalopathy    New onset a-fib      SECONDARY DIAGNOSES  Past Medical History:   Diagnosis Date   • Dementia    • Hyperlipidemia    • Hypertension        CONSULTS   Consults     Date and Time Order Name Status Description    4/10/2018 2252 Inpatient Cardiology Consult Completed     4/10/2018 1913 [facility] (on-call MD unless specified) Completed           PROCEDURES PERFORMED  CXR:  No focal pulmonary consolidation. Cardiomegaly. Ectatic appearing aorta.     CT Head:  No acute intracranial hemorrhage or hydrocephalus    TTE:  · Left ventricular wall thickness is consistent with moderate concentric hypertrophy.  · Left ventricular systolic function is normal. Estimated EF = 60%.  · Left atrial cavity size is severely dilated.       HOSPITAL COURSE  Patient is a 89 y.o. female who presented to [facility] from nursing facility with confusion.  Please see the admitting history and physical for further details. She was diagnosed with a UTI. Her UCx grew out Klebsiella. She completed 7 days of IV antibiotic therapy and her mental status improved back to her baseline. She was diagnosed with new onset AFib while here as well. She was deemed not to be a good anticoagulation candidate so this was not started. Her BP meds were titrated up and amlodipine was added to her regimen.        CONDITION ON DISCHARGE  Stable.      VITAL SIGNS  /73 (BP Location: Left arm)   Pulse 106   Temp 98 °F (36.7 °C) (Oral)   Resp 16   Ht 167.6 cm (66\")   Wt 87.3 kg (192 lb 7 oz)   SpO2 92%   BMI 31.06 kg/m²   Objective  General Appearance:  Comfortable and well-appearing.    Vital signs: (most recent): Blood pressure 132/97, pulse 99, temperature 98.9 °F (37.2 °C), temperature source Oral, resp. rate 16, " "height 167.6 cm (66\"), weight 87.3 kg (192 lb 7 oz), SpO2 92 %.  Vital signs are normal.    HEENT: Normal HEENT exam.    Lungs:  Normal effort.  Breath sounds clear to auscultation.    Heart: Normal rate.  Irregular rhythm.    Abdomen: Abdomen is soft and non-distended.  Bowel sounds are normal.   There is no abdominal tenderness.     Extremities: There is no deformity or dependent edema.    Neurological: Patient is alert.  (Oriented x1).    Skin:  Warm and dry.  No rash.     DISCHARGE DISPOSITION   Skilled Nursing Facility (DC - External)      DISCHARGE MEDICATIONS   Shira Sosa   Home Medication Instructions OVIDIO:[id number]    Printed on:04/16/18 0273   Medication Information                      amLODIPine (NORVASC) 10 MG tablet  Take 1 tablet by mouth Daily.             aspirin 81 MG tablet  Take 81 mg by mouth Daily.             carvedilol (COREG) 25 MG tablet  Take 1 tablet by mouth 2 (Two) Times a Day With Meals.             Cholecalciferol (VITAMIN D3) 2000 UNITS tablet  Take  by mouth.             donepezil (ARICEPT) 5 MG tablet  Take 5 mg by mouth Every Night.             levothyroxine (SYNTHROID, LEVOTHROID) 25 MCG tablet  Take 25 mcg by mouth Daily.             lisinopril (PRINIVIL,ZESTRIL) 20 MG tablet  Take 1 tablet by mouth Daily.             Memantine HCl (NAMENDA XR PO)  Take 28 mg by mouth.             pravastatin (PRAVACHOL) 20 MG tablet  Take 20 mg by mouth Daily.                No future appointments.  Follow-up Information     Clara Ann Pallares, MD .    Specialty:  Internal Medicine  Contact information:  [address]  [address]  [place] [id number]  [phone]                   TEST  RESULTS PENDING AT DISCHARGE         Kwadwo Zhao MD  [facility]  04/16/18  2:54 PM      Time: greater than 30 minutes.      Dragon disclaimer:  Much of this encounter note is an electronic transcription/translation of spoken language to printed text. The " electronic translation of spoken language may permit erroneous, or at times, nonsensical words or phrases to be inadvertently transcribed; Although I have reviewed the note for such errors, some may still exist.

## 2018-04-16 NOTE — PROGRESS NOTES
Case Management Discharge Note    Final Note: Patient will be Dc'd to skilled bed at [facility] via [facility].    Destination     Service Request Status Selected Specialties Address Phone Number Fax Number    [facility] Pending - Request Sent N/A [id number] [facility], [facility] [address] [phone]      Durable Medical Equipment     No service coordination in this encounter.      Dialysis/Infusion     No service coordination in this encounter.      Home Medical Care     No service coordination in this encounter.      Social Care     No service coordination in this encounter.        [facility]: [facility]  [facility]C [facility]: [facility] ([facility]    Final Discharge Disposition Code: 03 - skilled nursing facility (SNF)

## 2018-04-16 NOTE — PROGRESS NOTES
Case Management Discharge Note    Final Note: Patient will be DC'd to skilled bed at [facility] via [facility].    Destination     Service Request Status Selected Specialties Address Phone Number Fax Number    [facility] Pending - Request Sent N/A [id number] [facility], [facility] [address] [phone]      Durable Medical Equipment     No service coordination in this encounter.      Dialysis/Infusion     No service coordination in this encounter.      Home Medical Care     No service coordination in this encounter.      Social Care     No service coordination in this encounter.        [facility]: [facility]    Final Discharge Disposition Code: 03 - skilled nursing facility (SNF)

## 2018-04-16 NOTE — PROGRESS NOTES
Shira Sosa   89 y.o.  female    LOS: 6 days   Patient Care Team:  Clara Ann Pallares, MD as PCP - General (Internal Medicine)      Subjective     Interval History:     Admit Complaint: AMS    Review of Systems:   No c/o pain or SOA    Medication Review:   Current Facility-Administered Medications:   •  amLODIPine (NORVASC) tablet 10 mg, 10 mg, Oral, Q24H, Kyle Shoemaker MD  •  aspirin EC tablet 81 mg, 81 mg, Oral, Daily, Kwadwo Zhao MD, 81 mg at 04/15/18 0855  •  atorvastatin (LIPITOR) tablet 10 mg, 10 mg, Oral, Daily, Kwadwo Zhao MD, 10 mg at 04/15/18 0855  •  carvedilol (COREG) tablet 25 mg, 25 mg, Oral, BID With Meals, KITTY Armas, 25 mg at 04/15/18 1844  •  cefTRIAXone (ROCEPHIN) IVPB 1 g, 1 g, Intravenous, Q24H, Kwadwo Zhao MD, Last Rate: 100 mL/hr at 04/15/18 1844, 1 g at 04/15/18 1844  •  cholecalciferol (VITAMIN D3) tablet 1,000 Units, 1,000 Units, Oral, Daily, Kwadwo Zhao MD, 1,000 Units at 04/15/18 0855  •  donepezil (ARICEPT) tablet 5 mg, 5 mg, Oral, Nightly, Kwadwo Zhao MD, 5 mg at 04/15/18 2026  •  levothyroxine (SYNTHROID, LEVOTHROID) tablet 25 mcg, 25 mcg, Oral, Q AM, Kwadwo Zhao MD, 25 mcg at 04/16/18 0659  •  lisinopril (PRINIVIL,ZESTRIL) tablet 20 mg, 20 mg, Oral, Daily, KITTY Armas, 20 mg at 04/15/18 0855  •  memantine (NAMENDA) tablet 10 mg, 10 mg, Oral, Q12H, Kwadwo Zhao MD, 10 mg at 04/15/18 2026  •  sodium chloride 0.9 % flush 1-10 mL, 1-10 mL, Intravenous, PRN, Kwadwo Zhao MD  •  Insert peripheral IV, , , Once **AND** sodium chloride 0.9 % flush 10 mL, 10 mL, Intravenous, PRN, Domenic Menezes MD      Objective     Vital Sign Min/Max for last 24 hours  Temp  Min: 97.6 °F (36.4 °C)  Max: 99.2 °F (37.3 °C)   BP  Min: 106/68  Max: 151/78    Pulse  Min: 65  Max: 106     Wt Readings from Last 3 Encounters:   04/10/18 87.3 kg (192 lb 7 oz)   09/28/17 72.6 kg (160 lb)   10/24/16 72.6  kg (160 lb)        Intake/Output Summary (Last 24 hours) at 04/16/18 1033  Last data filed at 04/16/18 0505   Gross per 24 hour   Intake              480 ml   Output              300 ml   Net              180 ml     Physical Exam:      General Appearance:    Well developed and well nourished in no acute distress   Head:    Normocephalic, atraumatic   Eyes:            Conjunctivae normal, non icteric, no xanthelasma       Lungs:     Clear to auscultation bilaterally. No wheezes, rhonchi or rales. No accessory muscle use.     Heart:    Irregular rate and rhythm.  Normal S1 and S2. No murmur, no gallop, rub or lift.    Chest Wall:    No abnormalities observed   Abdomen:     Normal bowel sounds, soft        non-tender, non-distended, no guarding   Rectal:     Deferred   Extremities:   No clubbing, cyanosis or edema.     Pulses:   Pulses palpable and equal bilaterally   Skin:   No bleeding, bruising or rash   Neurologic:   awake alert and very confused,speech clear and  Not approp, no facial drooping      Monitor:  Afib with rates 90s  Results Review:     I reviewed the patient's new clinical results.    Sodium Sodium   Date Value Ref Range Status   04/15/2018 144 136 - 145 mmol/L Final      Potassium Potassium   Date Value Ref Range Status   04/15/2018 3.3 (L) 3.5 - 5.2 mmol/L Final      Chloride Chloride   Date Value Ref Range Status   04/15/2018 108 (H) 98 - 107 mmol/L Final      Bicarbonate No results found for: PLASMABICARB   BUN BUN   Date Value Ref Range Status   04/15/2018 12 8 - 23 mg/dL Final      Creatinine Creatinine   Date Value Ref Range Status   04/15/2018 0.66 0.57 - 1.00 mg/dL Final      Calcium Calcium   Date Value Ref Range Status   04/15/2018 8.6 8.6 - 10.5 mg/dL Final      Magnesium No results found for: MG       Results from last 7 days  Lab Units 04/12/18  0637   WBC 10*3/mm3 6.68   HEMOGLOBIN g/dL 12.2   HEMATOCRIT % 39.8   PLATELETS 10*3/mm3 153       Lab Results  Lab Value Date/Time   TROPONINT  "<0.010 04/10/2018 1823   TROPONINT <0.010 09/28/2017 0034   TROPONINT <0.010 10/24/2016 1840   TROPONINT <0.01 02/03/2015 0456   TROPONINT <0.01 02/02/2015 0401   TROPONINT <0.01 02/01/2015 1331   TROPONINT <0.01 08/29/2014 1750     No results found for: CHOL  Lab Results   Component Value Date    HDL 50 02/03/2015     Lab Results   Component Value Date     (H) 02/03/2015     Lab Results   Component Value Date    TRIG 115 02/03/2015     No components found for: CHOLHDL  [unfilled]    Results from last 7 days  Lab Units 04/10/18  1823   INR  1.12*         Echo EF Estimated  Lab Results   Component Value Date    ECHOEFEST 60 04/11/2018         Assessment/Plan   Assessment/ Plan    Principal Problem:    UTI (urinary tract infection) with pyuria  Active Problems:    Dementia    Encephalopathy    New diagnosis of a-fib    HTN    Echocardiogram: LVEF = 60%, severe left atrium dilatation  Not a candidate for anticoagulation due to dementia and risk of falls. Continue ASA.      Sandra Rascon, KITTY  04/16/18  10:33 AM      Time: 15 min    Magen Burks M.D.   Reviewed our cardiology group Nurse Practitioner's note.    Pt interviewed and examined.   Findings verified.   Reviewed and agree with corrections or modifications of history, physical, and plans as indicated:   Apparently staying awake all night, and sleeping on and off during the day.  Wonder if there is an element of sleep apnea associated with the atrial fibrillation.  We'll try overnight oximetry, but I'm not sure that she will sleep or keep it on, with significant elements of disorientation and dementia.  She told me that she was \"going downstairs for dinner \".  EMR Dragon/Transcription disclaimer:   Much of this encounter note is an electronic transcription/translation of spoken language to printed text. The electronic translation of spoken language may permit erroneous, or at times, nonsensical words or phrases to be inadvertently transcribed.  " Although I have reviewed the note for such errors, some may still exist. Please contact me if needed for clarification or correction.  Magen Burks M.D.

## 2018-04-25 PROBLEM — IMO0002 STROKE SYNDROME: Status: ACTIVE | Noted: 2018-01-01

## 2018-04-25 PROBLEM — R58 ECCHYMOSIS: Status: ACTIVE | Noted: 2018-01-01

## 2018-04-25 PROBLEM — E87.6 HYPOKALEMIA: Status: ACTIVE | Noted: 2018-01-01

## 2018-04-26 PROBLEM — R23.3 PETECHIAL HEMORRHAGE: Status: ACTIVE | Noted: 2018-01-01

## 2018-04-27 PROBLEM — Z66 DNR (DO NOT RESUSCITATE): Status: ACTIVE | Noted: 2018-01-01

## 2018-05-01 PROBLEM — I63.412 CEREBROVASCULAR ACCIDENT (CVA) DUE TO EMBOLISM OF LEFT MIDDLE CEREBRAL ARTERY (HCC): Status: ACTIVE | Noted: 2018-01-01

## 2018-05-01 NOTE — PROGRESS NOTES
Name: Shira Sosa ADMIT: 2018   : 1928  PCP: Jose Daniel Spence MD    MRN: [id number] LOS: 6 days   AGE/SEX: 89 y.o. female  ROOM: [facility]   Subjective   ROS  Unable to be obtained due to patients mental status    Objective   Vital Signs  Temp:  [97.5 °F (36.4 °C)-97.8 °F (36.6 °C)] 97.8 °F (36.6 °C)  Heart Rate:  [102-125] 102  Resp:  [16-18] 16  BP: (132-148)/() 132/85  SpO2:  [92 %-98 %] 92 %  on   ;   Device (Oxygen Therapy): room air  Body mass index is 23.49 kg/m².    Physical Exam   Constitutional:   elderly   Eyes: Conjunctivae are normal. No scleral icterus.   Neck: Neck supple. No tracheal deviation present.   Cardiovascular: An irregular rhythm present. Tachycardia present.    No murmur heard.  Pulmonary/Chest: Effort normal and breath sounds normal.   Abdominal: Soft. There is no tenderness. There is no guarding.   Neurological: She is alert. She is disoriented. She displays atrophy. She exhibits abnormal muscle tone.   Skin: Skin is warm and dry.   Psychiatric: She is inattentive.       Results Review:       I reviewed the patient's new clinical results.    Results from last 7 days  Lab Units 18   WBC 10*3/mm3 10.78* 9.76   HEMOGLOBIN g/dL 12.0 12.2   PLATELETS 10*3/mm3 175 173       Results from last 7 days  Lab Units 18   SODIUM mmol/L 143 138 140   POTASSIUM mmol/L 3.5 3.2* 3.0*   CHLORIDE mmol/L 108* 105 104   CO2 mmol/L 21.1* 22.0 24.5   BUN mg/dL 13 13 9   CREATININE mg/dL 0.69 0.75 0.74   GLUCOSE mg/dL 111* 146* 104*   Estimated Creatinine Clearance: 51.2 mL/min (by C-G formula based on SCr of 0.69 mg/dL).    Results from last 7 days  Lab Units 18   CALCIUM mg/dL 8.8 8.5* 8.9   ALBUMIN g/dL  --  3.00* 3.30*   MAGNESIUM mg/dL 1.9  --  1.9     CT Head Without Contrast [[id number]] Anoop as Reviewed   Order Status: Completed Collected: 18 1126    Updated:  04/26/18 1126   Narrative:     STAT NONCONTRAST HEAD CT -  04/26/2018.        HISTORY: Patient has a stroke, has dementia, weakness and confusion.     TECHNIQUE: Spiral CT images were obtained from the base of the skull to  the vertex without intravenous contrast, and images were reformatted and  submitted in 3 mm thick axial CT section with brain algorithm.     This is correlated to prior noncontrast head CTs from [facility] on 04/10/2018 and 04/25/2018.     FINDINGS: Reidentified is the acute to subacute infarct involving the  majority of the lateral left temporal lobe extending into the  anterolateral left occipital lobe and inferior lateral left parietal  lobe measures about 8 x 3 cm in anterior, posterior, and mediolateral  dimension.  There are areas of infarcted brain that are nearly isodense  to cortex.  There may be areas of cortical sparing or some minimal  cortical petechial hemorrhage into the infarct and this infarct is in  the distribution of the inferior division M2 branch left middle cerebral  artery territory. There is also subtle blurring of the gray-white  differentiation extending from posterior medial left temporal lobe  throughout the inferior medial left occipital lobe measuring up to 6 x 2  cm in anterior, posterior, mediolateral dimension best seen on axial CT  images 16 through 19 suspicious for subtle left posterior cerebral  artery territory infarct, and there is an area of low-density in the  lateral left thalamus measuring 19 x 6 mm in size suspicious for acute  to subacute infarct in the distribution of the thalamoperforator  branches of the left posterior cerebral artery. There is a 6 x 4 mm old  lacunar infarct in the superolateral right thalamus. There is patchy  low-density extending from the periventricular into the subcortical  white matter of the cerebral hemispheres consistent with moderate small  vessel disease.  There is a 6 x 4 mm old lacunar infarct in  central left  putamen and a 4 mm old lacunar infarct posterior superior lateral right  putamen.  The ventricles are normal in size, there is no midline shift,  no extra-axial fluid collections are identified and no nan acute  intracranial hemorrhage is seen.  The paranasal sinuses and mastoid air  cells and middle ear cavities are clear.      Impression:     1. No significant interval change when compared to head CT yesterday  evening 04/25/2018 at 9:30 PM.  2. There is acute to subacute infarct throughout the inferior-posterior  division of the left middle cerebral artery territory involving the  majority of the lateral left temporal lobe and anterolateral left  occipital lobe and inferior lateral left parietal lobe measuring 8 x 3  cm in anterior, posterior, medial and lateral dimension. Furthermore,  there is most probably a 6 x 2 cm acute to subacute infarct extending  from the posterior medial left temporal lobe throughout the inferior  medial left occipital lobe and left posterior cerebral artery territory,  a 9 x 6 mm acute to subacute lacunar type infarct in the lateral left  thalamus in the distribution of the thalamoperforator branches off the  P1 segment left posterior cerebral artery. There are areas of cortical  isodensity in the areas of acute to subacute infarction that may be  areas of cortical petechial hemorrhage and the infarcts can be further  characterized with an MRI of the head if clinically indicated.  2. Moderate small vessel disease in the cerebral white matter and 6 mm  old lacunar infarct anterior superior lateral right thalamus, old  lacunar infarcts in left putamen and left corona radiata region.  The  remainder of the head CT is within normal limits.     The results and recommendations were communicated to Dr. Juan by  telephone 04/26/2018 at 10:30 AM.     Radiation dose reduction techniques were utilized, including automated  exposure control and exposure modulation based on body  size.         XR Spine Lumbar 4+ View [[id number]] Anoop as Reviewed   Order Status: Completed Collected: 04/25/18 2313    Updated: 04/25/18 2313   Narrative:     X-RAY LUMBAR SPINE 4 VIEWS.     HISTORY: Back pain, fall.     COMPARISON: No prior studies for comparison.     FINDINGS:  Vertebral body height is normal, no acute fracture. Multilevel loss of  intervertebral disc height and spurring. Mild to moderate facet joint  disease. Mild loss of superior endplate height of T12 may be related to  a chronic fracture.     Postsurgical changes in the pelvis.     Prevertebral soft tissues are unremarkable.       Impression:     No fracture or subluxation of the lumbar spine. Moderate spondylosis.      CT Head Without Contrast [[id number]] Anoop as Reviewed   Order Status: Completed Collected: 04/25/18 2158    Updated: 04/25/18 2158   Narrative:     CT SCAN OF THE BRAIN WITHOUT CONTRAST.     TECHNIQUE: Radiation dose reduction techniques were utilized, including  automated exposure control and exposure modulation based on body size.  Multiple axial images of the brain were obtained from the vertex to the  base of the brain.     HISTORY: Fall, head injury. Confusion.     COMPARISON: 4/10/2018.     FINDINGS: Acute to subacute infarct of the left temporal lobe extending  into the left parieto-occipital region.     Midline structures are within normal limits, there is no hydrocephalus.  Gray-white matter differentiation is maintained. Findings of small  vessel ischemic disease, a few old lacunar infarcts and cortical  atrophy.     Orbits are within normal limits. No significant mucosal disease of the  para-nasal sinuses. Mastoid air cells are well aerated.             Impression:     Acute to subacute infarct of the left temporal lobe extending into the  left parieto-occipital lobe. No definite acute hemorrhage.            aspirin 81 mg Oral Daily   atorvastatin 40 mg Oral Nightly   carvedilol 6.25 mg Oral BID With Meals    levothyroxine 25 mcg Oral Q AM      Diet Dysphagia; II - Pureed; Nectar / Syrup Thick      Assessment/Plan      Active Hospital Problems (** Indicates Principal Problem)    Diagnosis Date Noted   • **Cerebrovascular accident (CVA) due to embolism of left middle cerebral artery [I63.412] 04/25/2018   • DNR (do not resuscitate) [Z66] 04/27/2018   • Petechial hemorrhage [R23.3] 04/26/2018   • Ecchymosis [R58] 04/25/2018   • Hypokalemia [E87.6] 04/25/2018   • New onset a-fib [I48.91] 04/11/2018   • Encephalopathy [G93.40] 04/10/2018   • Dementia [F03.90] 04/10/2018      Resolved Hospital Problems    Diagnosis Date Noted Date Resolved   No resolved problems to display.       Ms. Sosa is a 89 y.o. female with a history of dementia, HTN, COPD, Afib not on a/c due to fall/bleeding risk who has been admitted with a large acute ischemic stroke with petechial hemorrhage.     · Patient was transferred to palliative care yesterday for comfort measures. Family understands this is end-of-life care. Will ask [facility] to evaluate.  · His continue her oral medications such as Lipitor as they are clearly no longer indicated.  · Will consult [facility]. Maybe appropriate for scattered bed status.     D/W RN   D/W [facility] nurse  Discussed with daughter at bedside. Answered all questions regarding her diagnosis and prognosis to the best of my ability. Greater than 35 minutes spent with greater than 50% counseling and coordinating care.    Reviewed previous records    Jcarlos Arciniega MD  [facility]  05/01/18  9:18 AM

## 2018-05-01 NOTE — PLAN OF CARE
Problem: Patient Care Overview  Goal: Plan of Care Review  Outcome: Ongoing (interventions implemented as appropriate)   05/01/18 7649   Coping/Psychosocial   Plan of Care Reviewed With patient;family   Plan of Care Review   Progress declining   OTHER   Outcome Summary Pt medicated x1 for s/s of pain and restlessness. Pt appears comfortable at this time. Family at bedside. Spoke with JOSE Arriaga several times today and he is aware of patients condition. Will continue to monitor per comfort care.      Goal: Individualization and Mutuality  Outcome: Ongoing (interventions implemented as appropriate)    Goal: Discharge Needs Assessment  Outcome: Ongoing (interventions implemented as appropriate)    Goal: Interprofessional Rounds/Family Conf  Outcome: Ongoing (interventions implemented as appropriate)      Problem: Fall Risk (Adult)  Goal: Absence of Fall  Outcome: Ongoing (interventions implemented as appropriate)      Problem: Skin Injury Risk (Adult)  Goal: Skin Health and Integrity  Outcome: Ongoing (interventions implemented as appropriate)      Problem: Dying Patient, Actively (Adult)  Goal: Comfort/Pain Control  Outcome: Ongoing (interventions implemented as appropriate)    Goal: Peace/Preservation of Dignity During the Dying Process  Outcome: Ongoing (interventions implemented as appropriate)

## 2018-05-01 NOTE — PLAN OF CARE
Problem: Patient Care Overview  Goal: Plan of Care Review  Outcome: Ongoing (interventions implemented as appropriate)   05/01/18 0354   Coping/Psychosocial   Plan of Care Reviewed With patient   Plan of Care Review   Progress no change   OTHER   Outcome Summary Pt resting comfortably tonight; will arise to voice and movement; No meds given over night; will continue to monitor       Problem: Fall Risk (Adult)  Goal: Absence of Fall  Outcome: Ongoing (interventions implemented as appropriate)      Problem: Stroke (Ischemic) (Adult)  Goal: Signs and Symptoms of Listed Potential Problems Will be Absent, Minimized or Managed (Stroke)  Outcome: Ongoing (interventions implemented as appropriate)      Problem: Skin Injury Risk (Adult)  Goal: Skin Health and Integrity  Outcome: Ongoing (interventions implemented as appropriate)      Problem: Dying Patient, Actively (Adult)  Goal: Comfort/Pain Control  Outcome: Ongoing (interventions implemented as appropriate)

## 2018-05-01 NOTE — SIGNIFICANT NOTE
05/01/18 1109   Rehab Treatment   Discipline physical therapy assistant   Reason Treatment Not Performed (PT to sign off not appropriate for PT at this time per nursing)

## 2018-05-01 NOTE — CONSULTS
initiated visit with patient however, pt unable to speak. Pt was awake and somewhat responsive with facial and hand movements to stimuli.   spoke directly to and remained present at bedside while visiting with patient and daughters.  Spiritual Assessment:  Per daughter, pt is a member of [employer], however has been unable to attend for awhile.  According to her, pt is ready to go be with [facility]up[facility]. Pt. Is supported by her family, currently her two daughters who are present.  Pt has six children.  was told that pt's wish was for all six siblings to get-along well.

## 2018-05-02 NOTE — PROGRESS NOTES
Name: Shira Sosa ADMIT: 2018   : 1928  PCP: Jose Daniel Spence MD    MRN: [id number] LOS: 7 days   AGE/SEX: 89 y.o. female  ROOM: [facility]   Subjective   Somnolent.    Objective   Vital Signs  Temp:  [98 °F (36.7 °C)-99 °F (37.2 °C)] 99 °F (37.2 °C)  Heart Rate:  [116-118] 118  Resp:  [18] 18  BP: (116-148)/(68-93) 116/68  SpO2:  [89 %-92 %] 92 %  on   ;   Device (Oxygen Therapy): room air  Body mass index is 23.49 kg/m².    Physical Exam   Constitutional: She appears lethargic. She appears ill.   Cardiovascular: An irregular rhythm present. Tachycardia present.    No murmur heard.  Pulmonary/Chest: Effort normal and breath sounds normal.   Abdominal: Soft. There is no tenderness. There is no guarding.   Neurological: She appears lethargic.   Skin: Skin is warm and dry.       Results Review:       I reviewed the patient's new clinical results.    Results from last 7 days  Lab Units 18   WBC 10*3/mm3 10.78* 9.76   HEMOGLOBIN g/dL 12.0 12.2   PLATELETS 10*3/mm3 175 173       Results from last 7 days  Lab Units 18   SODIUM mmol/L 143 138 140   POTASSIUM mmol/L 3.5 3.2* 3.0*   CHLORIDE mmol/L 108* 105 104   CO2 mmol/L 21.1* 22.0 24.5   BUN mg/dL 13 13 9   CREATININE mg/dL 0.69 0.75 0.74   GLUCOSE mg/dL 111* 146* 104*   Estimated Creatinine Clearance: 51.2 mL/min (by C-G formula based on SCr of 0.69 mg/dL).    Results from last 7 days  Lab Units 18   CALCIUM mg/dL 8.8 8.5* 8.9   ALBUMIN g/dL  --  3.00* 3.30*   MAGNESIUM mg/dL 1.9  --  1.9     CT Head Without Contrast [[id number]] Anoop as Reviewed   Order Status: Completed Collected: 18 1126    Updated: 18   Narrative:     STAT NONCONTRAST HEAD CT -  2018.        HISTORY: Patient has a stroke, has dementia, weakness and confusion.     TECHNIQUE: Spiral CT images were obtained from the base of the skull to  the vertex  without intravenous contrast, and images were reformatted and  submitted in 3 mm thick axial CT section with brain algorithm.     This is correlated to prior noncontrast head CTs from [facility] on 04/10/2018 and 04/25/2018.     FINDINGS: Reidentified is the acute to subacute infarct involving the  majority of the lateral left temporal lobe extending into the  anterolateral left occipital lobe and inferior lateral left parietal  lobe measures about 8 x 3 cm in anterior, posterior, and mediolateral  dimension.  There are areas of infarcted brain that are nearly isodense  to cortex.  There may be areas of cortical sparing or some minimal  cortical petechial hemorrhage into the infarct and this infarct is in  the distribution of the inferior division M2 branch left middle cerebral  artery territory. There is also subtle blurring of the gray-white  differentiation extending from posterior medial left temporal lobe  throughout the inferior medial left occipital lobe measuring up to 6 x 2  cm in anterior, posterior, mediolateral dimension best seen on axial CT  images 16 through 19 suspicious for subtle left posterior cerebral  artery territory infarct, and there is an area of low-density in the  lateral left thalamus measuring 19 x 6 mm in size suspicious for acute  to subacute infarct in the distribution of the thalamoperforator  branches of the left posterior cerebral artery. There is a 6 x 4 mm old  lacunar infarct in the superolateral right thalamus. There is patchy  low-density extending from the periventricular into the subcortical  white matter of the cerebral hemispheres consistent with moderate small  vessel disease.  There is a 6 x 4 mm old lacunar infarct in central left  putamen and a 4 mm old lacunar infarct posterior superior lateral right  putamen.  The ventricles are normal in size, there is no midline shift,  no extra-axial fluid collections are identified and no nan acute  intracranial  hemorrhage is seen.  The paranasal sinuses and mastoid air  cells and middle ear cavities are clear.      Impression:     1. No significant interval change when compared to head CT yesterday  evening 04/25/2018 at 9:30 PM.  2. There is acute to subacute infarct throughout the inferior-posterior  division of the left middle cerebral artery territory involving the  majority of the lateral left temporal lobe and anterolateral left  occipital lobe and inferior lateral left parietal lobe measuring 8 x 3  cm in anterior, posterior, medial and lateral dimension. Furthermore,  there is most probably a 6 x 2 cm acute to subacute infarct extending  from the posterior medial left temporal lobe throughout the inferior  medial left occipital lobe and left posterior cerebral artery territory,  a 9 x 6 mm acute to subacute lacunar type infarct in the lateral left  thalamus in the distribution of the thalamoperforator branches off the  P1 segment left posterior cerebral artery. There are areas of cortical  isodensity in the areas of acute to subacute infarction that may be  areas of cortical petechial hemorrhage and the infarcts can be further  characterized with an MRI of the head if clinically indicated.  2. Moderate small vessel disease in the cerebral white matter and 6 mm  old lacunar infarct anterior superior lateral right thalamus, old  lacunar infarcts in left putamen and left corona radiata region.  The  remainder of the head CT is within normal limits.     The results and recommendations were communicated to Dr. Juan by  telephone 04/26/2018 at 10:30 AM.     Radiation dose reduction techniques were utilized, including automated  exposure control and exposure modulation based on body size.         XR Spine Lumbar 4+ View [[id number]] Anoop as Reviewed   Order Status: Completed Collected: 04/25/18 2313    Updated: 04/25/18 2313   Narrative:     X-RAY LUMBAR SPINE 4 VIEWS.     HISTORY: Back pain, fall.     COMPARISON: No  prior studies for comparison.     FINDINGS:  Vertebral body height is normal, no acute fracture. Multilevel loss of  intervertebral disc height and spurring. Mild to moderate facet joint  disease. Mild loss of superior endplate height of T12 may be related to  a chronic fracture.     Postsurgical changes in the pelvis.     Prevertebral soft tissues are unremarkable.       Impression:     No fracture or subluxation of the lumbar spine. Moderate spondylosis.      CT Head Without Contrast [[id number]] Anoop as Reviewed   Order Status: Completed Collected: 04/25/18 2158    Updated: 04/25/18 2158   Narrative:     CT SCAN OF THE BRAIN WITHOUT CONTRAST.     TECHNIQUE: Radiation dose reduction techniques were utilized, including  automated exposure control and exposure modulation based on body size.  Multiple axial images of the brain were obtained from the vertex to the  base of the brain.     HISTORY: Fall, head injury. Confusion.     COMPARISON: 4/10/2018.     FINDINGS: Acute to subacute infarct of the left temporal lobe extending  into the left parieto-occipital region.     Midline structures are within normal limits, there is no hydrocephalus.  Gray-white matter differentiation is maintained. Findings of small  vessel ischemic disease, a few old lacunar infarcts and cortical  atrophy.     Orbits are within normal limits. No significant mucosal disease of the  para-nasal sinuses. Mastoid air cells are well aerated.             Impression:     Acute to subacute infarct of the left temporal lobe extending into the  left parieto-occipital lobe. No definite acute hemorrhage.              Diet Dysphagia; II - Pureed; Nectar / Syrup Thick      Assessment/Plan      Active Hospital Problems (** Indicates Principal Problem)    Diagnosis Date Noted   • **Cerebrovascular accident (CVA) due to embolism of left middle cerebral artery [I63.412] 04/25/2018   • DNR (do not resuscitate) [Z66] 04/27/2018   • Petechial hemorrhage [R23.3]  04/26/2018   • Ecchymosis [R58] 04/25/2018   • Hypokalemia [E87.6] 04/25/2018   • New onset a-fib [I48.91] 04/11/2018   • Encephalopathy [G93.40] 04/10/2018   • Dementia [F03.90] 04/10/2018      Resolved Hospital Problems    Diagnosis Date Noted Date Resolved   No resolved problems to display.       Ms. Sosa is a 89 y.o. female with a history of dementia, HTN, COPD, Afib not on a/c due to fall/bleeding risk who has been admitted with a large acute ischemic stroke with petechial hemorrhage.     · Patient was transferred to palliative care yesterday for comfort measures. Family understands this is end-of-life care.  · Will consult [facility]. Maybe appropriate for scattered bed status.     D/W RN       Jcarlos Arciniega MD  [facility]  05/02/18  8:37 AM

## 2018-05-02 NOTE — PROGRESS NOTES
Continued Stay Note   [facility]     Patient Name: Shira Sosa  MRN: [id number]  Today's Date: 5/2/2018    Admit Date: 4/25/2018          Discharge Plan     Row Name 05/02/18 1501       Plan    Plan Assessing for [facility]    Plan Comments Spoke with [facility] intake RN who stated that she has spoke with pt's son and pt is agreeable to [facility] and [facility].   [facility] has faxed consent forms to pt's son  who will sign and fax back.   CCP will follow up with [facility] when admsiion is complete.                  Discharge Codes    No documentation.       Expected Discharge Date and Time     Expected Discharge Date Expected Discharge Time    May 1, 2018             MARIELLE Ridley

## 2018-05-02 NOTE — PLAN OF CARE
Problem: Patient Care Overview  Goal: Plan of Care Review  Outcome: Ongoing (interventions implemented as appropriate)   05/02/18 2481   Coping/Psychosocial   Plan of Care Reviewed With patient;family   Plan of Care Review   Progress declining   OTHER   Outcome Summary Pt medicated x2 for nonverbal indicators of pain and restlessness. Pt appears comfortable at this time. FC placed. Family at bedside. Will continue to monitor per comfort care.      Goal: Individualization and Mutuality  Outcome: Ongoing (interventions implemented as appropriate)    Goal: Discharge Needs Assessment  Outcome: Ongoing (interventions implemented as appropriate)    Goal: Interprofessional Rounds/Family Conf  Outcome: Ongoing (interventions implemented as appropriate)      Problem: Fall Risk (Adult)  Goal: Absence of Fall  Outcome: Ongoing (interventions implemented as appropriate)      Problem: Skin Injury Risk (Adult)  Goal: Skin Health and Integrity  Outcome: Ongoing (interventions implemented as appropriate)      Problem: Dying Patient, Actively (Adult)  Goal: Comfort/Pain Control  Outcome: Ongoing (interventions implemented as appropriate)    Goal: Peace/Preservation of Dignity During the Dying Process  Outcome: Ongoing (interventions implemented as appropriate)

## 2018-05-03 NOTE — DISCHARGE SUMMARY
Date of Admission: 4/25/2018  Date of Discharge:  5/3/2018  Primary Care Physician: Jose Daniel Spence MD     Discharge Diagnosis:  Principal Problem:    Cerebrovascular accident (CVA) due to embolism of left middle cerebral artery  Active Problems:    Dementia    Encephalopathy    New onset a-fib    Ecchymosis    Hypokalemia    Petechial hemorrhage    DNR (do not resuscitate)      DETAILS OF HOSPITAL STAY     Pertinent Test Results and Procedures Performed    CXR:  No focal pulmonary consolidation. Cardiomegaly. Tortuous  aorta. Follow-up as clinically indicated.    Head CT 4/25/18:  Acute to subacute infarct of the left temporal lobe extending into the  left parieto-occipital lobe. No definite acute hemorrhage.     Lumbar xray:  No fracture or subluxation of the lumbar spine. Moderate spondylosis.    Head CT 4/26/18:  1. No significant interval change when compared to head CT yesterday  evening 04/25/2018 at 9:30 PM.  2. There is an acute to subacute infarct throughout the  inferior-posterior division of the left middle cerebral artery territory  involving the majority of the lateral left temporal lobe and  anterolateral left occipital lobe and inferior lateral left parietal  lobe measuring 8 x 3 cm in anterior posterior and medial lateral  dimension. Furthermore, there is most probably a 6 x 2 cm acute to  subacute infarct extending from the posterior medial left temporal lobe  throughout the inferior medial left occipital lobe and left posterior  cerebral artery territory, a 9 x 6 mm acute to subacute lacunar type  infarct in the lateral left thalamus in the distribution of the  thalamoperforator branches off the P1 segment left posterior cerebral  artery. There are areas of cortical isodensity in the areas of acute to  subacute infarction that may be areas of cortical petechial hemorrhage  and the infarcts can be further characterized with an MRI of the head if  clinically indicated.  2. Moderate small vessel  disease in the cerebral white matter and 6 mm  old lacunar infarct anterior superior lateral right thalamus, old  lacunar infarcts in left putamen and left corona radiata region.  The  remainder of the head CT is within normal limits.    HPI  Patient is a 89 y.o. female who presents to [facility] with the above chief complaint.  She came in from the nursing home for altered mental status and a fall.  She was recently in the hospital at the beginning of the month for a urinary tract infection with metabolic encephalopathy and new onset atrial fibrillation.  She was seen and evaluated by cardiology but given her unsteadiness on her feet we did not anticoagulate her.  She was discharged to [facility] where she had been doing okay up until Friday.  She had a fall on Friday during the day it's unclear when this happened or how it happened but ever since then she's had very difficult time eating she has been acting like herself really hasn't been talking very well.  The daughter at the bedside denies any focal weakness in her arms or legs and has been walking at the nursing home.  Her speech has not been very fluent but she has been talking at times.  Her appetite been poor and she has not been eating well.  Unfortunately the patient herself only grunts to answer questions she withdraws all 4 extremities to pain but can't provide any history or self.    Hospital Course    Ms. Sosa is a 89 y.o. female with a history of dementia, HTN, COPD, Afib not on a/c due to fall/bleeding risk who has been admitted with a large acute ischemic stroke with petechial hemorrhage. Initially she was quite lethargic. She became more but with significant deficits with global aphasia.  She was deemed to have a very poor prognosis. Palliative care was consulted. She has a high likelihood of passing away in the coming weeks and we recommended avoiding prolonged suffering. Palliative care met with her POA, Bart  Henrique, and wishes the patient to be transferred to [address] [place] [address] for comfort care. She has shown terminal decline requiring morphine and ativan for symptom control.  She has now become unresponsive, was evaluated by Hospice and will be admitted to a scattered bed.      Physical Exam at Discharge:  General: No acute distress, Comfortable, terminally ill appearing  HEENT: EOMI, PERRL  Cardiovascular: +s1 and s2, Tachycardic  Lungs: No rhonchi or wheezing, tachypneic  Abdomen: soft, nontender    Consults:   Consults     Date and Time Order Name Status Description    4/25/2018 2314 Inpatient Neurology Consult Completed     4/25/2018 2207 [facility] (on-call MD unless specified) Completed     4/10/2018 2254 Inpatient Cardiology Consult Completed     4/10/2018 1913 [facility] (on-call MD unless specified) Completed             Condition on Discharge: Terminal    Discharge Disposition  Hospice/Medical Facility ([facility])       Js Bernstein MD  05/03/18  2:53 PM    Time: Discharge greater than 30 min

## 2018-05-03 NOTE — PLAN OF CARE
Problem: Dying Patient, Actively (Adult)  Goal: Comfort/Pain Control  Outcome: Ongoing (interventions implemented as appropriate)    Goal: Peace/Preservation of Dignity During the Dying Process  Outcome: Ongoing (interventions implemented as appropriate)      Problem: Patient Care Overview  Goal: Plan of Care Review  Outcome: Ongoing (interventions implemented as appropriate)   05/03/18 6309   Coping/Psychosocial   Plan of Care Reviewed With patient;daughter;son   Plan of Care Review   Progress declining   OTHER   Outcome Summary Pt medicated x2 for s/s of discomfort. Pt appears comfortable at this time. Will continue to monitor per comfort care.      Goal: Individualization and Mutuality  Outcome: Ongoing (interventions implemented as appropriate)    Goal: Discharge Needs Assessment  Outcome: Ongoing (interventions implemented as appropriate)    Goal: Interprofessional Rounds/Family Conf  Outcome: Ongoing (interventions implemented as appropriate)      Problem: Skin Injury Risk (Adult)  Goal: Skin Health and Integrity  Outcome: Ongoing (interventions implemented as appropriate)

## 2018-05-03 NOTE — PLAN OF CARE
Problem: Patient Care Overview  Goal: Plan of Care Review  Outcome: Ongoing (interventions implemented as appropriate)   05/03/18 0516   Plan of Care Review   Progress declining   OTHER   Outcome Summary sm amt carlton urine in f/c, unresponsive, turned, med for pain     Goal: Discharge Needs Assessment  Outcome: Ongoing (interventions implemented as appropriate)    Goal: Interprofessional Rounds/Family Conf  Outcome: Ongoing (interventions implemented as appropriate)      Problem: Skin Injury Risk (Adult)  Goal: Skin Health and Integrity  Outcome: Ongoing (interventions implemented as appropriate)      Problem: Dying Patient, Actively (Adult)  Goal: Comfort/Pain Control  Outcome: Ongoing (interventions implemented as appropriate)    Goal: Peace/Preservation of Dignity During the Dying Process  Outcome: Ongoing (interventions implemented as appropriate)

## 2018-05-03 NOTE — PROGRESS NOTES
" LOS: 8 days     Name: Shira Sosa  Age: 89 y.o.  Sex: female  :  1928  MRN: [id number]         Primary Care Physician: Jose Daniel Spence MD    Subjective   Subjective  Unresponsive, no overnight events    Objective   Vital Signs  Temp:  [98.4 °F (36.9 °C)-100.7 °F (38.2 °C)] 98.4 °F (36.9 °C)  Heart Rate:  [115-130] 115  Resp:  [20-22] 20  BP: (144)/(89) 144/89  Body mass index is 23.49 kg/m².    Objective:  General Appearance:  Comfortable, in no acute distress and ill-appearing.    Vital signs: (most recent): Blood pressure 144/89, pulse 115, temperature 98.4 °F (36.9 °C), temperature source Oral, resp. rate 20, height 170.2 cm (67\"), weight 68 kg (150 lb), SpO2 94 %.    Lungs:  Normal effort and tachypnea.  There are decreased breath sounds.    Heart: Tachycardia.  Regular rhythm.    Abdomen: Abdomen is soft.  Bowel sounds are normal.   There is no abdominal tenderness.     Extremities: There is no dependent edema or local swelling.    Neurological: Patient is alert and oriented to person, place and time.    Skin:  Warm and dry.            Results Review:       I reviewed the patient's new clinical results.          Results from last 7 days  Lab Units 18  2103   SODIUM mmol/L 143   POTASSIUM mmol/L 3.5   CHLORIDE mmol/L 108*   CO2 mmol/L 21.1*   BUN mg/dL 13   CREATININE mg/dL 0.69   CALCIUM mg/dL 8.8   GLUCOSE mg/dL 111*                 Scheduled Meds:      PRN Meds:   •  acetaminophen **OR** acetaminophen **OR** acetaminophen  •  acetaminophen  •  diphenoxylate-atropine  •  Glycerin-Hypromellose-  •  haloperidol **OR** haloperidol **OR** haloperidol lactate  •  hydrALAZINE  •  HYDROmorphone **OR** Morphine **OR** morphine  •  HYDROmorphone **OR** Morphine **OR** morphine  •  LORazepam **OR** LORazepam  •  LORazepam **OR** LORazepam  •  LORazepam **OR** LORazepam  •  ondansetron  •  sodium chloride  Continuous Infusions:       Assessment/Plan   Principal Problem:    Cerebrovascular " accident (CVA) due to embolism of left middle cerebral artery  Active Problems:    Dementia    Encephalopathy    New onset a-fib    Ecchymosis    Hypokalemia    Petechial hemorrhage    DNR (do not resuscitate)      Assessment & Plan    - Continue comfort measures.  Admit to hospice scattered bed once family signs consent.      Js Bernstein MD  [facility]  05/03/18  1:03 PM

## 2018-05-04 PROBLEM — Z51.5 ADMISSION FOR HOSPICE CARE: Status: ACTIVE | Noted: 2018-01-01

## 2018-05-04 NOTE — H&P
[facility] Admission H&P    Patient Care Team:  Jose Daniel Spence MD as PCP - General (Family Medicine)    Chief complaint: Does not have one.  Admitted to a hospice scattered bed    History of Present Illness    Ms. Sosa is a 89 y.o. female with a history of dementia, HTN, COPD, Afib not on a/c due to fall/bleeding risk who has been admitted with a large acute ischemic stroke with petechial hemorrhage. Initially she was quite lethargic. She became more alert but with significant deficits and global aphasia.  She was deemed to have a very poor prognosis. Palliative care was consulted. She has a high likelihood of passing away in the coming weeks and we recommended avoiding prolonged suffering. Palliative care met with her POA, Bart Duenas, who wished for the patient to be transferred to [address] [place] [address] for comfort care. She has shown terminal decline requiring morphine and ativan for symptom control.  She has now become unresponsive, was evaluated by Hospice and will be admitted to a scattered bed.    Past Medical History:   Diagnosis Date   • Atrial fibrillation    • COPD (chronic obstructive pulmonary disease)    • Dementia    • Heart disease    • Hyperlipidemia    • Hypertension    • UTI (urinary tract infection)      Past Surgical History:   Procedure Laterality Date   • HYSTERECTOMY     • JOINT REPLACEMENT       No family history on file.  Social History   Substance Use Topics   • Smoking status: Never Smoker   • Smokeless tobacco: Never Used   • Alcohol use No      Comment: occasional     No prescriptions prior to admission.     Allergies:  Review of patient's allergies indicates no known allergies.    Review of Systems   Unable to perform ROS: Dementia        PHYSICAL EXAM    Vital Signs  tMax 24 hrs:  Temp (24hrs), Av.9 °F (37.2 °C), Min:98.8 °F (37.1 °C), Max:98.9 °F (37.2 °C)    Vitals Ranges:  Temp:  [98.8 °F (37.1 °C)-98.9 °F (37.2 °C)] 98.9 °F (37.2 °C)  Heart Rate:  [] 106  Resp:  [16] 16  BP:  (132-152)/(78-89) 152/89    Physical Exam   Constitutional: She appears well-developed and well-nourished.   Terminally ill appearing.  Appears uncomfortable with wrinkling of her forehead and writhing in bed.   HENT:   Head: Normocephalic and atraumatic.   Eyes: EOM are normal. Pupils are equal, round, and reactive to light.   Neck: Neck supple. No tracheal deviation present.   Cardiovascular: Regular rhythm.  Exam reveals no gallop.    No murmur heard.  Tachycardic   Pulmonary/Chest: No respiratory distress. She has no wheezes.   Abdominal: Soft. Bowel sounds are normal. She exhibits no distension. There is no tenderness.   Musculoskeletal: She exhibits no edema or tenderness.   Neurological:   Unresponsive   Skin: Skin is warm and dry.   Nursing note and vitals reviewed.      Results Review:        Results from last 7 days  Lab Units 04/29/18  2103   SODIUM mmol/L 143   POTASSIUM mmol/L 3.5   CHLORIDE mmol/L 108*   CO2 mmol/L 21.1*   BUN mg/dL 13   CREATININE mg/dL 0.69   CALCIUM mg/dL 8.8   GLUCOSE mg/dL 111*        I reviewed the patient's new clinical results.      Principal Problem:    Cerebrovascular accident (CVA) due to embolism of left middle cerebral artery  Active Problems:    Dementia    Encephalopathy    New onset a-fib    Admission for hospice care      Assessment & Plan    The patient has been admitted to a hospice scattered bed.  Continue comfort measures.  I discussed with her son at bedside.  She appears uncomfortable and in pain at the moment.  I recommended we go ahead and medicate her again to which he agrees.  I discussed with his nurse who will bring her medication shortly.    I discussed the patients findings and my recommendations with family and nursing staff    Js Bernstein MD  05/04/18  12:46 PM

## 2018-05-04 NOTE — PROGRESS NOTES
Case Management Discharge Note    Final Note: Admitted to a [facility] on 5/3/18. LORENZA Calles RN, CCP    Destination - Selection Complete     Service Request Status Selected Specialties Address Phone Number Fax Number    [facility] Selected Hospice [address], [facility] [id number] [phone]      Durable Medical Equipment     No service coordination in this encounter.      Dialysis/Infusion     No service coordination in this encounter.      Home Medical Care     No service coordination in this encounter.      Social Care     No service coordination in this encounter.             Final Discharge Disposition Code: 51 - hospice medical facility

## 2018-05-04 NOTE — PLAN OF CARE
Problem: Dying Patient, Actively (Adult)  Goal: Comfort/Pain Control  Outcome: Ongoing (interventions implemented as appropriate)    Goal: Peace/Preservation of Dignity During the Dying Process  Outcome: Ongoing (interventions implemented as appropriate)      Problem: Patient Care Overview  Goal: Plan of Care Review  Outcome: Ongoing (interventions implemented as appropriate)   05/04/18 8209   Coping/Psychosocial   Plan of Care Reviewed With family   Plan of Care Review   Progress no change   OTHER   Outcome Summary Patient only responsive to pain. unable to speak. shows signs of pain and anxiety at times. Family is hesitant to medicate. educated on signs of pain and the need to medicate prior to turning. allowed at times, other times, requested us to wait. will continue to monitor and advocate for patient comfort.      Goal: Individualization and Mutuality  Outcome: Ongoing (interventions implemented as appropriate)    Goal: Discharge Needs Assessment  Outcome: Ongoing (interventions implemented as appropriate)    Goal: Interprofessional Rounds/Family Conf  Outcome: Ongoing (interventions implemented as appropriate)      Problem: Skin Injury Risk (Adult)  Goal: Skin Health and Integrity  Outcome: Ongoing (interventions implemented as appropriate)

## 2018-05-04 NOTE — PLAN OF CARE
Problem: Dying Patient, Actively (Adult)  Goal: Comfort/Pain Control  Outcome: Ongoing (interventions implemented as appropriate)    Goal: Peace/Preservation of Dignity During the Dying Process  Outcome: Ongoing (interventions implemented as appropriate)      Problem: Patient Care Overview  Goal: Plan of Care Review  Outcome: Ongoing (interventions implemented as appropriate)   05/03/18 1634 05/03/18 2321 05/04/18 0455   Coping/Psychosocial   Plan of Care Reviewed With --  daughter;family --    Plan of Care Review   Progress declining --  --    OTHER   Outcome Summary --  --  Pt rested well. Medicated for comfort X2. Family at bedside. Continue comfort care.      Goal: Individualization and Mutuality  Outcome: Ongoing (interventions implemented as appropriate)    Goal: Discharge Needs Assessment  Outcome: Ongoing (interventions implemented as appropriate)      Problem: Skin Injury Risk (Adult)  Goal: Skin Health and Integrity  Outcome: Ongoing (interventions implemented as appropriate)

## 2018-05-05 NOTE — CONSULTS
[facility] Visit Note  Collaborated with facility staff, Saranya Nixon; no issues/concerns reported. Patient surrounded by her children, nieces and nephews. Patient is unresponsive, however appears comfortable. Respirations are irregular with 15-20 sec periods of apnea. One daughter appears anxious regarding apneic episodes. This RN provided support and discussed with daughter s/s immient death. Most recent V/S patient sats 90% on Ra. Patient with audible congestion and occasional coughing. This RN observes family providing oral care to patient with swabs. F/C noted to bedside drain with minimal carlton urine. In the last 24 hours patient has received IV Ativan 0.5mg x4 doses and IV Morphine 4mg x4 doses. I inquired, to staff, if patient could receive a dose of Robinul r/t audible congestion. Will continue to visit daily, assess needs of patient/family and provide support.  Nancy Rodas RN  [facility] Visit Nurse

## 2018-05-05 NOTE — PLAN OF CARE
Problem: Dying Patient, Actively (Adult)  Goal: Comfort/Pain Control  Outcome: Ongoing (interventions implemented as appropriate)    Goal: Peace/Preservation of Dignity During the Dying Process  Outcome: Ongoing (interventions implemented as appropriate)      Problem: Patient Care Overview  Goal: Plan of Care Review  Outcome: Ongoing (interventions implemented as appropriate)   05/05/18 4312   Coping/Psychosocial   Plan of Care Reviewed With patient;family   Plan of Care Review   Progress declining   OTHER   Outcome Summary Pt.'s breathing becoming more labored and has periods of apnea with robinul now on board. Patient medicated for symptom management. A large group of family at bedside. Will monitor.      Goal: Individualization and Mutuality  Outcome: Ongoing (interventions implemented as appropriate)    Goal: Discharge Needs Assessment  Outcome: Ongoing (interventions implemented as appropriate)    Goal: Interprofessional Rounds/Family Conf  Outcome: Ongoing (interventions implemented as appropriate)      Problem: Skin Injury Risk (Adult)  Goal: Skin Health and Integrity  Outcome: Ongoing (interventions implemented as appropriate)      Problem: Fall Risk (Adult)  Goal: Identify Related Risk Factors and Signs and Symptoms  Outcome: Outcome(s) achieved Date Met: 05/05/18    Goal: Absence of Fall  Outcome: Ongoing (interventions implemented as appropriate)

## 2018-05-05 NOTE — PROGRESS NOTES
LOS: 2 days     Name: Shira Sosa  Age: 89 y.o.  Sex: female  :  1928  MRN: [id number]         Primary Care Physician: Jose Daniel Spence MD    Subjective   Subjective  Remains unresponsive.  Requiring frequent doses of morphine and Ativan to maintain control.    Objective   Vital Signs  Temp:  [98 °F (36.7 °C)-98.2 °F (36.8 °C)] 98 °F (36.7 °C)  Heart Rate:  [] 86  Resp:  [16-18] 18  BP: ()/(64-90) 98/64  There is no height or weight on file to calculate BMI.    Objective:  General Appearance:  Comfortable and in no acute distress (Terminally ill-appearing).    Vital signs: (most recent): Blood pressure 98/64, pulse 86, temperature 98 °F (36.7 °C), temperature source Oral, resp. rate 18, SpO2 90 %.    Lungs:  Normal effort and tachypnea.    Heart: Tachycardia.  Regular rhythm.    Abdomen: Abdomen is soft.  Bowel sounds are normal.   There is no abdominal tenderness.     Extremities: There is no dependent edema or local swelling.    Neurological: Patient is alert and oriented to person, place and time.    Skin:  Warm and dry.              Results Review:       I reviewed the patient's new clinical results.          Results from last 7 days  Lab Units 18  2103   SODIUM mmol/L 143   POTASSIUM mmol/L 3.5   CHLORIDE mmol/L 108*   CO2 mmol/L 21.1*   BUN mg/dL 13   CREATININE mg/dL 0.69   CALCIUM mg/dL 8.8   GLUCOSE mg/dL 111*                 Scheduled Meds:      PRN Meds:   •  acetaminophen **OR** acetaminophen **OR** acetaminophen  •  acetaminophen  •  diphenoxylate-atropine  •  Glycerin-Hypromellose-  •  haloperidol **OR** haloperidol **OR** haloperidol lactate  •  hydrALAZINE  •  HYDROmorphone **OR** Morphine **OR** morphine  •  HYDROmorphone **OR** Morphine **OR** morphine  •  LORazepam **OR** LORazepam  •  LORazepam **OR** LORazepam  •  LORazepam **OR** LORazepam  •  ondansetron  •  sodium chloride  Continuous Infusions:       Assessment/Plan   Principal Problem:     Cerebrovascular accident (CVA) due to embolism of left middle cerebral artery  Active Problems:    Dementia    Encephalopathy    New onset a-fib    Admission for hospice care      Assessment & Plan    Continue comfort measures.  Prognosis is hours to days.  She is in a hospice scattered bed.      Js Bernstein MD  [facility]  05/05/18  12:34 PM

## 2018-05-05 NOTE — PLAN OF CARE
Problem: Dying Patient, Actively (Adult)  Goal: Comfort/Pain Control  Outcome: Ongoing (interventions implemented as appropriate)    Goal: Peace/Preservation of Dignity During the Dying Process  Outcome: Ongoing (interventions implemented as appropriate)      Problem: Patient Care Overview  Goal: Plan of Care Review  Outcome: Ongoing (interventions implemented as appropriate)   05/04/18 2002 05/05/18 0624   Coping/Psychosocial   Plan of Care Reviewed With family --    Plan of Care Review   Progress --  declining   OTHER   Outcome Summary --  Pt rested well. Medicated prior to Q4 turns. Family at bedside and assigned a sleep room on [facility]. Continue comfort care.      Goal: Discharge Needs Assessment  Outcome: Ongoing (interventions implemented as appropriate)      Problem: Skin Injury Risk (Adult)  Goal: Skin Health and Integrity  Outcome: Ongoing (interventions implemented as appropriate)

## 2018-05-07 NOTE — PROGRESS NOTES
Case Management Discharge Note    Final Note:  on  @ 0332. P.Britt RN    Destination - Selection Complete     Service Request Status Selected Specialties Address Phone Number Fax Number    [facility] Selected Hospice [address], [facility] [address] [phone]      Durable Medical Equipment     No service coordination in this encounter.      Dialysis/Infusion     No service coordination in this encounter.      Home Medical Care     No service coordination in this encounter.      Social Care     No service coordination in this encounter.             Final Discharge Disposition Code: 20 -

## 2018-05-07 NOTE — PROGRESS NOTES
Case Management Discharge Note    Final Note: [facility] LORENZA Calles RN, CCP.     Destination - Selection Complete     Service Request Status Selected Specialties Address Phone Number Fax Number    [facility] Selected Hospice [address], [facility] [id number] [phone]      Durable Medical Equipment     No service coordination in this encounter.      Dialysis/Infusion     No service coordination in this encounter.      Home Medical Care     No service coordination in this encounter.      Social Care     No service coordination in this encounter.             Final Discharge Disposition Code: 51 - hospice medical facility

## 2018-05-07 NOTE — PROGRESS NOTES
Case Management Discharge Note    Final Note: The patient  on 18 @  03:32. LORENZA Calles RN, CCP.     Destination - Selection Complete     Service Request Status Selected Specialties Address Phone Number Fax Number    [facility] Selected Hospice [address], [facility] [address] [phone]      Durable Medical Equipment     No service coordination in this encounter.      Dialysis/Infusion     No service coordination in this encounter.      Home Medical Care     No service coordination in this encounter.      Social Care     No service coordination in this encounter.             Final Discharge Disposition Code: 41 -  in medical facility

## 2018-05-08 NOTE — DISCHARGE SUMMARY
Date of Admission: 2018  Date of Death:  2018 @ 0332  Primary Care Physician: Jose Daniel Spence MD     Discharge Diagnosis:  Principal Problem:    Cerebrovascular accident (CVA) due to embolism of left middle cerebral artery  Active Problems:    Dementia    Encephalopathy    New onset a-fib    Admission for hospice care      DETAILS OF HOSPITAL STAY     Hospital Course    Ms. Ian cm a 89 y.o. female with a history of dementia, HTN, COPD, Afib not on a/c due to fall/bleeding risk who has been admitted with a large acute ischemic stroke with petechial hemorrhage. Initially she was quite lethargic. She became more alert but with significant deficits and global aphasia.  She was deemed to have a very poor prognosis. Palliative care was consulted. She had a high likelihood of passing away in the coming weeks and it was recommended to avoid prolonged suffering. Palliative care met with her POA, Bart Duenas, who wished for the patient to be transferred to [address] [place] [address] for comfort care. She showd terminal decline requiring morphine and ativan for symptom control.  She become unresponsive, was evaluated by Hospice and admitted to a scattered bed where she remained until her passing.    Consults:   Consults     Date and Time Order Name Status Description    2018 2314 Inpatient Neurology Consult Completed     2018 2207 [facility] (on-call MD unless specified) Completed     4/10/2018 225 Inpatient Cardiology Consult Completed     4/10/2018 1913 [facility] (on-call MD unless specified) Completed         Discharge Disposition         Js Bernstein MD  18  3:11 PM    Time: Discharge 20 min